# Patient Record
Sex: FEMALE | Race: WHITE | Employment: FULL TIME | URBAN - METROPOLITAN AREA
[De-identification: names, ages, dates, MRNs, and addresses within clinical notes are randomized per-mention and may not be internally consistent; named-entity substitution may affect disease eponyms.]

---

## 2018-10-05 DIAGNOSIS — M25.511 ACUTE PAIN OF RIGHT SHOULDER: Primary | ICD-10-CM

## 2018-10-08 ENCOUNTER — OFFICE VISIT (OUTPATIENT)
Dept: ORTHOPEDIC SURGERY | Age: 53
End: 2018-10-08
Payer: COMMERCIAL

## 2018-10-08 VITALS — TEMPERATURE: 98 F | BODY MASS INDEX: 25.44 KG/M2 | WEIGHT: 149 LBS | HEIGHT: 64 IN

## 2018-10-08 DIAGNOSIS — S46.219A BICEPS TENDON TEAR: Primary | ICD-10-CM

## 2018-10-08 DIAGNOSIS — M75.121 COMPLETE TEAR OF RIGHT ROTATOR CUFF: ICD-10-CM

## 2018-10-08 PROCEDURE — 99203 OFFICE O/P NEW LOW 30 MIN: CPT | Performed by: ORTHOPAEDIC SURGERY

## 2018-10-08 RX ORDER — LEVOTHYROXINE SODIUM 112 MCG
TABLET ORAL
COMMUNITY
Start: 2018-09-05 | End: 2019-05-14

## 2018-10-08 RX ORDER — TIZANIDINE 4 MG/1
TABLET ORAL
Refills: 0 | COMMUNITY
Start: 2018-07-09 | End: 2019-05-14

## 2018-10-08 NOTE — PROGRESS NOTES
SYSTEMS:     General/Constitution:  (-)weight loss, (-)fever, (-)chills, (-)weakness. Skin: (-) rash,(-) psoriasis,(-) eczema, (-)skin cancer. Musculoskeletal: (-) fractures,  (-) dislocations,(-) collagen vascular disease, (-) fibromyalgia, (-) multiple sclerosis, (-) muscular dystrophy, (-) RSD,(-) joint pain (-)swelling, (-) joint pain,swelling. Neurologic: (-) epilepsy, (-)seizures,(-) brain tumor,(-) TIA, (-)stroke, (-)headaches, (-)Parkinson disease,(-) memory loss, (-) LOC. Cardiovascular: (-) Chest pain, (-) swelling in legs/feet, (-) SOB, (-) cramping in legs/feet with walking. Respiratory: (-) SOB, (-) Coughing, (-) night sweats. GI: (-) nausea, (-) vomiting, (-) diarrhea, (-) blood in stool, (-) gastric ulcer. Psychiatric: (-) Depression, (-) Anxiety, (-) bipolar disease, (-) Alzheimer's Disease  Allergic/Immunologic: (-) allergies latex, (-) allergies metal, (-) skin sensitivity. Hematlogic: (-) anemia, (-) blood transfusion, (-) DVT/PE, (-) Clotting disorders      Subjective:    Constitution:  Ht. 5 ft 4 in. , Wt. 149 lbs. Psycihatric:  The patient is alert and oriented x 3, appears to be stated age and in no distress. Respiratory:  Respiratory effort is not labored. Patient is not gasping. Palpation of the chest reveals no tactile fremitus. Skin:  Upon inspection: the skin appears warm, dry and intact. There is not a previous scar over the affected area. There is not any cellulitis, lymphedema or cutaneous lesions noted in the lower extremities. Upon palpation there is no induration noted. Neurologic:  Motor exam of the upper extremities show: The reflexes in biceps/triceps/brachioradialis are equal and symmetric. Sensory exam C5-T1 are normal bilaterally. Cardiovascular: The vascular exam is normal and is well perfused to distal extremities. There are 2+ radial pulses bilaterally, and motor and sensation is intact to median, ulnar, and radial, musclocutaneus, and right rotator cuff        Plan: Natural history and expected course discussed. Questions answered. Educational material distributed. Reduction in offending activity. MRI. I will order an MRI of her right shoulder and see her back with the results.

## 2018-10-20 ENCOUNTER — HOSPITAL ENCOUNTER (OUTPATIENT)
Dept: GENERAL RADIOLOGY | Age: 53
Discharge: HOME | End: 2018-10-22
Payer: COMMERCIAL

## 2018-10-20 ENCOUNTER — HOSPITAL ENCOUNTER (OUTPATIENT)
Dept: MRI IMAGING | Age: 53
Discharge: HOME | End: 2018-10-22
Payer: COMMERCIAL

## 2018-10-20 DIAGNOSIS — T15.90XA FOREIGN BODY IN EYE, UNSPECIFIED LATERALITY, INITIAL ENCOUNTER: ICD-10-CM

## 2018-10-20 DIAGNOSIS — M75.121 COMPLETE TEAR OF RIGHT ROTATOR CUFF: ICD-10-CM

## 2018-10-20 DIAGNOSIS — S46.219A BICEPS TENDON TEAR: ICD-10-CM

## 2018-10-20 PROCEDURE — 70030 X-RAY EYE FOR FOREIGN BODY: CPT

## 2018-10-20 PROCEDURE — 73221 MRI JOINT UPR EXTREM W/O DYE: CPT

## 2018-11-06 ENCOUNTER — OFFICE VISIT (OUTPATIENT)
Dept: ORTHOPEDIC SURGERY | Age: 53
End: 2018-11-06
Payer: COMMERCIAL

## 2018-11-06 VITALS — WEIGHT: 150 LBS | HEIGHT: 64 IN | BODY MASS INDEX: 25.61 KG/M2

## 2018-11-06 DIAGNOSIS — M75.41 SHOULDER IMPINGEMENT, RIGHT: ICD-10-CM

## 2018-11-06 DIAGNOSIS — M75.111 INCOMPLETE TEAR OF RIGHT ROTATOR CUFF: Primary | ICD-10-CM

## 2018-11-06 DIAGNOSIS — S46.219A BICEPS TENDON TEAR: ICD-10-CM

## 2018-11-06 PROCEDURE — 99213 OFFICE O/P EST LOW 20 MIN: CPT | Performed by: ORTHOPAEDIC SURGERY

## 2018-11-06 RX ORDER — TRIAMCINOLONE ACETONIDE 40 MG/ML
40 INJECTION, SUSPENSION INTRA-ARTICULAR; INTRAMUSCULAR ONCE
Status: DISCONTINUED | OUTPATIENT
Start: 2018-11-06 | End: 2018-11-06

## 2018-11-06 RX ORDER — CELECOXIB 200 MG/1
200 CAPSULE ORAL DAILY
Qty: 30 CAPSULE | Refills: 3 | Status: SHIPPED | OUTPATIENT
Start: 2018-11-06 | End: 2019-05-14

## 2018-11-06 NOTE — PATIENT INSTRUCTIONS
arm (palm outward) behind your back by the wrist. Pull your arm up gently to stretch your shoulder. 3. Next, put a towel over your other shoulder. Put the hand of your injured arm behind your back. Now hold the back end of the towel. With the other hand, hold the front end of the towel in front of your body. Pull gently on the front end of the towel. This will bring your hand farther up your back to stretch your shoulder. Overhead stretch    1. Standing about an arm's length away, grasp onto a solid surface. You could use a countertop, a doorknob, or the back of a sturdy chair. 2. With your knees slightly bent, bend forward with your arms straight. Lower your upper body, and let your shoulders stretch. 3. As your shoulders are able to stretch farther, you may need to take a step or two backward. 4. Hold for at least 15 to 30 seconds. Then stand up and relax. If you had stepped back during your stretch, step forward so you can keep your hands on the solid surface. 5. Repeat 2 to 4 times. Shoulder flexion (lying down)    To make a wand for this exercise, use a piece of PVC pipe or a broom handle with the broom removed. Make the wand about a foot wider than your shoulders. 1. Lie on your back, holding a wand with both hands. Your palms should face down as you hold the wand. 2. Keeping your elbows straight, slowly raise your arms over your head. Raise them until you feel a stretch in your shoulders, upper back, and chest.  3. Hold for 15 to 30 seconds. 4. Repeat 2 to 4 times. Shoulder rotation (lying down)    To make a wand for this exercise, use a piece of PVC pipe or a broom handle with the broom removed. Make the wand about a foot wider than your shoulders. 1. Lie on your back. Hold a wand with both hands with your elbows bent and palms up. 2. Keep your elbows close to your body, and move the wand across your body toward the sore arm. 3. Hold for 8 to 12 seconds. 4. Repeat 2 to 4 times.   Wall climbing (to the side)    Avoid any movement that is straight to your side, and be careful not to arch your back. Your arm should stay about 30 degrees to the front of your side. 1. Stand with your side to a wall so that your fingers can just touch it at an angle about 30 degrees toward the front of your body. 2. Walk the fingers of your injured arm up the wall as high as pain permits. Try not to shrug your shoulder up toward your ear as you move your arm up. 3. Hold that position for a count of at least 15 to 20.  4. Walk your fingers back down to the starting position. 5. Repeat at least 2 to 4 times. Try to reach higher each time. Wall climbing (to the front)    During this stretching exercise, be careful not to arch your back. 1. Face a wall, and stand so your fingers can just touch it. 2. Keeping your shoulder down, walk the fingers of your injured arm up the wall as high as pain permits. (Don't shrug your shoulder up toward your ear.)  3. Hold your arm in that position for at least 15 to 30 seconds. 4. Slowly walk your fingers back down to where you started. 5. Repeat at least 2 to 4 times. Try to reach higher each time. Shoulder blade squeeze    1. Stand with your arms at your sides, and squeeze your shoulder blades together. Do not raise your shoulders up as you squeeze. 2. Hold 6 seconds. 3. Repeat 8 to 12 times. Scapular exercise: Arm reach    1. Lie flat on your back. This exercise is a very slight motion that starts with your arms raised (elbows straight, arms straight). 2. From this position, reach higher toward the mari or ceiling. Keep your elbows straight. All motion should be from your shoulder blade only. 3. Relax your arms back to where you started. 4. Repeat 8 to 12 times. Arm raise to the side    During this strengthening exercise, your arm should stay about 30 degrees to the front of your side. 1. Slowly raise your injured arm to the side, with your thumb facing up.  Raise your arm 60 comfort. This will help keep your arm at your side. 3. Hold one end of the elastic band with the hand of the painful arm. 4. Start with your forearm across your belly. Slowly rotate the forearm out away from your body. Keep your elbow and upper arm tucked against the towel roll or the side of your body until you begin to feel tightness in your shoulder. Slowly move your arm back to where you started. 5. Repeat 8 to 12 times. Follow-up care is a key part of your treatment and safety. Be sure to make and go to all appointments, and call your doctor if you are having problems. It's also a good idea to know your test results and keep a list of the medicines you take. Where can you learn more? Go to https://[employer]. org and sign in to your [employer] account. Enter Viridiana Silver in the [employer] box to learn more about \"Rotator Cuff: Exercises. \"     If you do not have an account, please click on the \"Sign Up Now\" link. Current as of: November 29, 2017  Content Version: 11.7  © 7678-2615 [employer]. Care instructions adapted under license by [facility] ([facility]). If you have questions about a medical condition or this instruction, always ask your healthcare professional. [address] any warranty or liability for your use of this information.

## 2019-03-21 ENCOUNTER — OFFICE VISIT (OUTPATIENT)
Dept: ORTHOPEDIC SURGERY | Age: 54
End: 2019-03-21
Payer: COMMERCIAL

## 2019-03-21 VITALS — BODY MASS INDEX: 24.75 KG/M2 | HEIGHT: 64 IN | TEMPERATURE: 98 F | WEIGHT: 145 LBS

## 2019-03-21 DIAGNOSIS — M75.111 INCOMPLETE TEAR OF RIGHT ROTATOR CUFF: Primary | ICD-10-CM

## 2019-03-21 DIAGNOSIS — S46.219A BICEPS TENDON TEAR: ICD-10-CM

## 2019-03-21 DIAGNOSIS — M75.41 SHOULDER IMPINGEMENT, RIGHT: ICD-10-CM

## 2019-03-21 PROCEDURE — 99213 OFFICE O/P EST LOW 20 MIN: CPT | Performed by: ORTHOPAEDIC SURGERY

## 2019-03-21 PROCEDURE — 20610 DRAIN/INJ JOINT/BURSA W/O US: CPT | Performed by: ORTHOPAEDIC SURGERY

## 2019-03-21 RX ORDER — TRIAMCINOLONE ACETONIDE 40 MG/ML
40 INJECTION, SUSPENSION INTRA-ARTICULAR; INTRAMUSCULAR ONCE
Status: COMPLETED | OUTPATIENT
Start: 2019-03-21 | End: 2019-03-21

## 2019-03-21 RX ADMIN — TRIAMCINOLONE ACETONIDE 40 MG: 40 INJECTION, SUSPENSION INTRA-ARTICULAR; INTRAMUSCULAR at 11:05

## 2019-05-14 ENCOUNTER — OFFICE VISIT (OUTPATIENT)
Dept: ORTHOPEDIC SURGERY | Age: 54
End: 2019-05-14
Payer: COMMERCIAL

## 2019-05-14 VITALS — WEIGHT: 145 LBS | BODY MASS INDEX: 24.75 KG/M2 | HEIGHT: 64 IN

## 2019-05-14 DIAGNOSIS — M75.41 SHOULDER IMPINGEMENT, RIGHT: ICD-10-CM

## 2019-05-14 DIAGNOSIS — M75.111 INCOMPLETE TEAR OF RIGHT ROTATOR CUFF: Primary | ICD-10-CM

## 2019-05-14 DIAGNOSIS — S46.219A BICEPS TENDON TEAR: ICD-10-CM

## 2019-05-14 PROCEDURE — 99214 OFFICE O/P EST MOD 30 MIN: CPT | Performed by: ORTHOPAEDIC SURGERY

## 2019-05-14 NOTE — PROGRESS NOTES
Days per week: Not on file     Minutes per session: Not on file    Stress: Not on file   Relationships    Social connections:     Talks on phone: Not on file     Gets together: Not on file     Attends Congregation service: Not on file     Active member of club or organization: Not on file     Attends meetings of clubs or organizations: Not on file     Relationship status: Not on file    Intimate partner violence:     Fear of current or ex partner: Not on file     Emotionally abused: Not on file     Physically abused: Not on file     Forced sexual activity: Not on file   Other Topics Concern    Not on file   Social History Narrative    Not on file     History reviewed. No pertinent family history. REVIEW OF SYSTEMS:     General/Constitution:  (-)weight loss, (-)fever, (-)chills, (-)weakness. Skin: (-) rash,(-) psoriasis,(-) eczema, (-)skin cancer. Musculoskeletal: (-) fractures,  (-) dislocations,(-) collagen vascular disease, (-) fibromyalgia, (-) multiple sclerosis, (-) muscular dystrophy, (-) RSD,(-) joint pain (-)swelling, (-) joint pain,swelling. Neurologic: (-) epilepsy, (-)seizures,(-) brain tumor,(-) TIA, (-)stroke, (-)headaches, (-)Parkinson disease,(-) memory loss, (-) LOC. Cardiovascular: (-) Chest pain, (-) swelling in legs/feet, (-) SOB, (-) cramping in legs/feet with walking. Respiratory: (-) SOB, (-) Coughing, (-) night sweats. GI: (-) nausea, (-) vomiting, (-) diarrhea, (-) blood in stool, (-) gastric ulcer. Psychiatric: (-) Depression, (-) Anxiety, (-) bipolar disease, (-) Alzheimer's Disease  Allergic/Immunologic: (-) allergies latex, (-) allergies metal, (-) skin sensitivity. Hematlogic: (-) anemia, (-) blood transfusion, (-) DVT/PE, (-) Clotting disorders      Subjective:    Constitution:  Ht 5' 4\" (1.626 m)   Wt 145 lb (65.8 kg)   BMI 24.89 kg/m²       Psycihatric:  The patient is alert and oriented x 3, appears to be stated age and in no distress.       Respiratory:  Respiratory effort is not labored. Patient is not gasping. Palpation of the chest reveals no tactile fremitus. Skin:  Upon inspection: the skin appears warm, dry and intact. There is not a previous scar over the affected area. There is not any cellulitis, lymphedema or cutaneous lesions noted in the lower extremities. Upon palpation there is no induration noted. Neurologic:  Motor exam of the upper extremities show: The reflexes in biceps/triceps/brachioradialis are equal and symmetric. Sensory exam C5-T1 are normal bilaterally. Cardiovascular: The vascular exam is normal and is well perfused to distal extremities. There are 2+ radial pulses bilaterally, and motor and sensation is intact to median, ulnar, and radial, musclocutaneus, and axillary nerve distribution and grossly symmetric bilaterally. There is cap refill noted less than two seconds in all digits. There is not edema of the bilateral upper extremities. There is not varicosities noted in the distal extremities. Lymph:  Upon palpation,  there is no lymphadenopathy noted in bilateral upper extremities. Musculoskeletal:  Gait: normal; examination of the nails and digits reveal no cyanosis or clubbing. Cervical Exam:  On physical exam, Noemi Dye is well-developed, well-nourished, oriented to person, place and time. her gait is normal.  On evaluation of hercervical spine, She has full range of motion of the cervical spine without pain. There is no cervical tenderness to palpation. Shoulder Exam:  On evaluation of her bilaterally upper extremities, her right shoulder has no deformity. There is tenderness upon palpation of the anterior and lateral shoulder, over the [facility] joint. There is not evidence of scapular dyskinesis. There is not muscle atrophy in shoulder girdle. The range of motion for the Right Shoulder is 150/40/T12 and for the Left shoulder is 160/45/T8.   Right shoulder Motor strength is 5-/5 in the supraspinatus, 5/5 internal rotation and 5-/5 in external rotation, and Left shoulder motor strength 5/5 in supraspinatus, 5/5 in internal rotation, 5/5 in external rotation. Right shoulder:  positive Impingement , positive Bermudez ,negative  Speeds,negative  Apprehension ,negative Craig Load Shift, negative Jessica manuver, negative Cross arm test.     Left shoulder:  negative Impingement , negative Bermudez ,negative  Speeds,negative  Apprehension ,negative Craig Load Shift, negative Jessica manuver, negative Cross arm test.     XRAY:    Normal findings, some AC joint arthritis    MRI:    Impression   1. Moderate supraspinatus and subscapularis tendinopathy and mild   infraspinatus tendinopathy. There is suggestion of bursal sided   fraying of the supraspinatus tendon. No full-thickness rotator cuff   tear is identified. 2. Partial tear of the intra-articular biceps tendon. 3. Mild acromioclavicular joint arthrosis with trace   subacromial/subdeltoid bursitis. Radiographic findings reviewed with patient    Impression:   Encounter Diagnoses   Name Primary?  Incomplete tear of right rotator cuff Yes    Shoulder impingement, right     Biceps tendon tear        Plan:   I had a lengthy discussion with the patient regarding their diagnosis. I explained treatment options including surgical vs non surgical treatment. I reviewed in detail the risks and benefits and outlined the procedure in detail with expected outcomes and possible complications. I also discussed non surgical treatment such as injections (CSI ), physical therapy, topical creams and NSAID's. She will find out if she has health insurance, if so we will proceed with right shoulder arthroscopy with sad and debridement.   Extend sick leave for 6 weeks, she will let us know about insurance and surgical treatment

## 2019-06-25 ENCOUNTER — OFFICE VISIT (OUTPATIENT)
Dept: ORTHOPEDIC SURGERY | Age: 54
End: 2019-06-25

## 2019-06-25 VITALS — HEIGHT: 64 IN | BODY MASS INDEX: 24.75 KG/M2 | WEIGHT: 145 LBS

## 2019-06-25 DIAGNOSIS — S46.219A BICEPS TENDON TEAR: ICD-10-CM

## 2019-06-25 DIAGNOSIS — M75.41 SHOULDER IMPINGEMENT, RIGHT: Primary | ICD-10-CM

## 2019-06-25 PROCEDURE — 99213 OFFICE O/P EST LOW 20 MIN: CPT | Performed by: ORTHOPAEDIC SURGERY

## 2019-06-25 PROCEDURE — 20610 DRAIN/INJ JOINT/BURSA W/O US: CPT | Performed by: ORTHOPAEDIC SURGERY

## 2019-06-25 RX ORDER — CELECOXIB 200 MG/1
CAPSULE ORAL
Refills: 0 | COMMUNITY
Start: 2019-06-14 | End: 2019-08-06 | Stop reason: SDUPTHER

## 2019-07-01 RX ORDER — TRIAMCINOLONE ACETONIDE 40 MG/ML
40 INJECTION, SUSPENSION INTRA-ARTICULAR; INTRAMUSCULAR ONCE
Status: SHIPPED | OUTPATIENT
Start: 2019-07-01

## 2019-08-06 ENCOUNTER — OFFICE VISIT (OUTPATIENT)
Dept: ORTHOPEDIC SURGERY | Age: 54
End: 2019-08-06
Payer: MEDICAID

## 2019-08-06 VITALS — BODY MASS INDEX: 24.77 KG/M2 | HEIGHT: 64 IN | WEIGHT: 145.06 LBS

## 2019-08-06 DIAGNOSIS — M75.41 SHOULDER IMPINGEMENT, RIGHT: Primary | ICD-10-CM

## 2019-08-06 DIAGNOSIS — M75.111 INCOMPLETE TEAR OF RIGHT ROTATOR CUFF, UNSPECIFIED WHETHER TRAUMATIC: ICD-10-CM

## 2019-08-06 PROCEDURE — G8427 DOCREV CUR MEDS BY ELIG CLIN: HCPCS | Performed by: ORTHOPAEDIC SURGERY

## 2019-08-06 PROCEDURE — 1036F TOBACCO NON-USER: CPT | Performed by: ORTHOPAEDIC SURGERY

## 2019-08-06 PROCEDURE — 99213 OFFICE O/P EST LOW 20 MIN: CPT | Performed by: ORTHOPAEDIC SURGERY

## 2019-08-06 PROCEDURE — G8420 CALC BMI NORM PARAMETERS: HCPCS | Performed by: ORTHOPAEDIC SURGERY

## 2019-08-06 PROCEDURE — 3017F COLORECTAL CA SCREEN DOC REV: CPT | Performed by: ORTHOPAEDIC SURGERY

## 2019-08-06 RX ORDER — CELECOXIB 200 MG/1
CAPSULE ORAL
Qty: 60 CAPSULE | Refills: 3 | Status: SHIPPED
Start: 2019-08-06 | End: 2022-08-06

## 2019-08-06 NOTE — PROGRESS NOTES
on file     Attends meetings of clubs or organizations: Not on file     Relationship status: Not on file    Intimate partner violence:     Fear of current or ex partner: Not on file     Emotionally abused: Not on file     Physically abused: Not on file     Forced sexual activity: Not on file   Other Topics Concern    Not on file   Social History Narrative    Not on file     History reviewed. No pertinent family history. REVIEW OF SYSTEMS:     General/Constitution:  (-)weight loss, (-)fever, (-)chills, (-)weakness. Skin: (-) rash,(-) psoriasis,(-) eczema, (-)skin cancer. Musculoskeletal: (-) fractures,  (-) dislocations,(-) collagen vascular disease, (-) fibromyalgia, (-) multiple sclerosis, (-) muscular dystrophy, (-) RSD,(-) joint pain (-)swelling, (-) joint pain,swelling. Neurologic: (-) epilepsy, (-)seizures,(-) brain tumor,(-) TIA, (-)stroke, (-)headaches, (-)Parkinson disease,(-) memory loss, (-) LOC. Cardiovascular: (-) Chest pain, (-) swelling in legs/feet, (-) SOB, (-) cramping in legs/feet with walking. Respiratory: (-) SOB, (-) Coughing, (-) night sweats. GI: (-) nausea, (-) vomiting, (-) diarrhea, (-) blood in stool, (-) gastric ulcer. Psychiatric: (-) Depression, (-) Anxiety, (-) bipolar disease, (-) Alzheimer's Disease  Allergic/Immunologic: (-) allergies latex, (-) allergies metal, (-) skin sensitivity. Hematlogic: (-) anemia, (-) blood transfusion, (-) DVT/PE, (-) Clotting disorders      Subjective:    Constitution:  Ht 5' 4.02\" (1.626 m)   Wt 145 lb 1 oz (65.8 kg)   BMI 24.89 kg/m²       Psycihatric:  The patient is alert and oriented x 3, appears to be stated age and in no distress. Respiratory:  Respiratory effort is not labored. Patient is not gasping. Palpation of the chest reveals no tactile fremitus. Skin:  Upon inspection: the skin appears warm, dry and intact. There is not a previous scar over the affected area. There is not any cellulitis, lymphedema or cutaneous lesions noted in the lower extremities. Upon palpation there is no induration noted. Neurologic:  Motor exam of the upper extremities show: The reflexes in biceps/triceps/brachioradialis are equal and symmetric. Sensory exam C5-T1 are normal bilaterally. Cardiovascular: The vascular exam is normal and is well perfused to distal extremities. There are 2+ radial pulses bilaterally, and motor and sensation is intact to median, ulnar, and radial, musclocutaneus, and axillary nerve distribution and grossly symmetric bilaterally. There is cap refill noted less than two seconds in all digits. There is not edema of the bilateral upper extremities. There is not varicosities noted in the distal extremities. Lymph:  Upon palpation,  there is no lymphadenopathy noted in bilateral upper extremities. Musculoskeletal:  Gait: normal; examination of the nails and digits reveal no cyanosis or clubbing. Cervical Exam:  On physical exam, Aram Lee is well-developed, well-nourished, oriented to person, place and time. her gait is normal.  On evaluation of hercervical spine, She has full range of motion of the cervical spine without pain. There is no cervical tenderness to palpation. Shoulder Exam:  On evaluation of her bilaterally upper extremities, her right shoulder has no deformity. There is tenderness upon palpation of the anterior and lateral shoulder, over the [facility] joint. There is not evidence of scapular dyskinesis. There is not muscle atrophy in shoulder girdle. The range of motion for the Right Shoulder is 150/40/T12 and for the Left shoulder is 160/45/T8. Right shoulder Motor strength is 5-/5 in the supraspinatus, 5/5 internal rotation and 5-/5 in external rotation, and Left shoulder motor strength 5/5 in supraspinatus, 5/5 in internal rotation, 5/5 in external rotation.         Right shoulder:  positive Impingement , positive Bermudez ,negative  Speeds,negative  Apprehension

## 2019-09-24 ENCOUNTER — TELEPHONE (OUTPATIENT)
Dept: ORTHOPEDIC SURGERY | Age: 54
End: 2019-09-24

## 2019-10-04 ENCOUNTER — TELEPHONE (OUTPATIENT)
Dept: ORTHOPEDIC SURGERY | Age: 54
End: 2019-10-04

## 2022-08-01 NOTE — H&P
[address]                              HISTORY AND PHYSICAL    PATIENT NAME: Audelia Ramos                    :        1965  MED REC NO:   [id number]                            ROOM:  ACCOUNT NO:   [de-identified]                           ADMIT DATE: 2022  PROVIDER:     Srinivas Manuel MD    Presenting for surgery 2022. .. HISTORY OF PRESENT ILLNESS:  The patient is a 35-year-old white female  who had presented with complaints of bloating and postmenopausal  bleeding. Ultrasound had demonstrated a 0.5 x 0.3 cm echogenic mass in  the endometrium, a 2 cm myometrial fibroid, and a one-half cm right  ovarian septated cyst with a 7-mm septation. She underwent an  endometrial biopsy which was negative. CA-125 was within normal limits  at 26. Continued expectant management ensued. The ovarian cyst  persisted as did the fibroid. She declined any further expectant  management and requested definitive surgical treatment. PAST MEDICAL HISTORY:  Significant for hypothyroidism. Hearing  deficiency. Hypercholesterolemia. PAST SURGICAL HISTORY:  Arm surgery. PAST GYNECOLOGIC HISTORY:  No abnormal Paps, DVT, PE, or STDs. 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 SOCIAL HISTORY:  No alcohol, tobacco or drugs. FAMILY HISTORY:  Noncontributory. ALLERGIES:  No known drug allergies. MEDICATIONS:  Simvastatin, levothyroxine. REVIEW OF SYSTEMS:  Negative for cardiac, respiratory, GI or   abnormalities. PHYSICAL EXAMINATION:  VITAL SIGNS:  Stable. Blood pressure 111/69, weight 159.5 pounds, and  height 5 feet, 3 inches. BMI 28. HEENT:  Negative. LUNGS:  Clear to auscultation. CV:  Regular rate and rhythm. ABDOMEN:  Obese, soft, nondistended, nontender. No masses. PELVIC:  External genitalia within normal limits. Vagina, no lesions.    Cervix, no lesions, prolapse to 1 cm above introitus. She has grade 2 uterine prolapse and enterocele. Uterus anteverted, 8 to 10 weeks' size, mobile and nontender. Adnexa nontender. No masses. RECTOVAGINAL:  Confirms she is guaiac negative. EXTREMITIES:  No clubbing, cyanosis, or edema. NEUROLOGIC:  CN II through XII are grossly intact. She is alert and  oriented x4. ASSESSMENT:  The patient with postmenopausal bleeding, bloating, fibroid  uterus, persistent 0.5 cm complex right adnexal mass. The patient  declining expectant management or conservative surgical management with  laparoscopic bilateral salpingo-oophorectomy and D and C hysteroscopy. Grade 2 uterine prolapse and enterocele. PLAN:  Laparoscopy, possible laparotomy, hysterectomy, bilateral  salpingo-oophorectomy, other procedures as indicated by operative  findings, robot assistance when available. She has been notified in the  event of excessive hemorrhage, she may require a blood transfusion. All  her questions have been answered and she wishes to proceed with surgery.       Aide Guerra MD    D: 07/31/2022 14:52:33       T: 07/31/2022 14:55:03     [email or web address]  Job#: [id number]     Doc#: [id number]

## 2022-08-04 ENCOUNTER — ANESTHESIA EVENT (OUTPATIENT)
Dept: OPERATING ROOM | Age: 57
End: 2022-08-04
Payer: COMMERCIAL

## 2022-08-04 RX ORDER — LORAZEPAM 0.5 MG/1
0.5 TABLET ORAL ONCE
Status: ON HOLD | COMMUNITY
End: 2022-08-06 | Stop reason: HOSPADM

## 2022-08-04 RX ORDER — LIOTHYRONINE SODIUM 5 UG/1
5 TABLET ORAL 2 TIMES DAILY
COMMUNITY

## 2022-08-04 RX ORDER — LEVOTHYROXINE SODIUM 88 UG/1
88 TABLET ORAL DAILY
COMMUNITY

## 2022-08-04 NOTE — PROGRESS NOTES
[address] [facility]                                                                                                                    PRE OP INSTRUCTIONS FOR  Erasmo Lobo        Date: 8/4/2022    Date of surgery: 8/5/2022   Arrival Time: Hospital will call you between 5pm and 7pm with your final arrival time for surgery    Nothing by mouth (NPO) as instructed. ___midnight _________________________________________________________________    Take the following medications with a small sip of water on the morning of Surgery:  take lorazepam    Diabetics may take evening dose of insulin but none after midnight. If you feel symptomatic or low blood sugar morning of surgery drink 1-2 ounces of apple juice only. Aspirin, Ibuprofen, Advil, Naproxen, Vitamin E and other Anti-inflammatory products should be stopped  before surgery  as directed by your physician. Take Tylenol only unless instructed otherwise by your surgeon. Check with your Doctor regarding stopping Plavix, Coumadin, Lovenox, Eliquis, Effient, or other blood thinners. Do not smoke,use illicit drugs and do not drink any alcoholic beverages 24 hours prior to surgery. You may brush your teeth the morning of surgery. DO NOT SWALLOW WATER    You MUST make arrangements for a responsible adult to take you home after your surgery. You will not be allowed to leave alone or drive yourself home. It is strongly suggested someone stay with you the first 24 hrs. Your surgery will be cancelled if you do not have a ride home. PEDIATRIC PATIENTS ONLY:  A parent/legal guardian must accompany a child scheduled for surgery and plan to stay at the hospital until the child is discharged. Please do not bring other children with you.     Please wear simple, loose fitting clothing to the hospital.  Carmen Lo not bring valuables (money, credit cards, checkbooks, etc.) Do not wear any makeup (including no eye makeup) or nail polish on your fingers or toes.    DO NOT wear any jewelry or piercings on day of surgery. All body piercing jewelry must be removed. Shower the night before surgery with _x__Antibacterial soap /LUCIEN WIPES________    TOTAL JOINT REPLACEMENT/HYSTERECTOMY PATIENTS ONLY---Remember to bring Blood Bank bracelet to the hospital on the day of surgery. If you have a Living Will and Durable Power of  for Healthcare, please bring in a copy. If appropriate bring crutches, inspirex, WALKER, CANE etc... Notify your Surgeon if you develop any illness between now and surgery time, cough, cold, fever, sore throat, nausea, vomiting, etc.  Please notify your surgeon if you experience dizziness, shortness of breath or blurred vision between now & the time of your surgery. If you have ___dentures, they will be removed before going to the OR; we will provide you a container. If you wear ___contact lenses or ___glasses, they will be removed; please bring a case for them. To provide excellent care visitors will be limited to 2 in the room at any given time. Please bring picture ID and insurance card. During flu season no children under the age of 15 are permitted in the hospital for the safety of all patients. Other                  Please call AMBULATORY CARE if you have any further questions.    [address]     [address]

## 2022-08-05 ENCOUNTER — ANESTHESIA (OUTPATIENT)
Dept: OPERATING ROOM | Age: 57
End: 2022-08-05
Payer: COMMERCIAL

## 2022-08-05 ENCOUNTER — HOSPITAL ENCOUNTER (OUTPATIENT)
Age: 57
Setting detail: OBSERVATION
Discharge: HOME | End: 2022-08-06
Attending: LEGAL MEDICINE | Admitting: LEGAL MEDICINE
Payer: COMMERCIAL

## 2022-08-05 DIAGNOSIS — N83.201 CYST OF RIGHT OVARY: ICD-10-CM

## 2022-08-05 PROBLEM — R19.00 PELVIC MASS IN FEMALE: Status: ACTIVE | Noted: 2022-08-05

## 2022-08-05 LAB
ABO/RH: NORMAL
ANTIBODY SCREEN: NORMAL
HCT VFR BLD CALC: 37.7 % (ref 34–48)
HCT VFR BLD CALC: 44.3 % (ref 34–48)
HEMOGLOBIN: 12.3 G/DL (ref 11.5–15.5)
HEMOGLOBIN: 15 G/DL (ref 11.5–15.5)
MCH RBC QN AUTO: 28.6 PG (ref 26–35)
MCH RBC QN AUTO: 29.1 PG (ref 26–35)
MCHC RBC AUTO-ENTMCNC: 32.6 % (ref 32–34.5)
MCHC RBC AUTO-ENTMCNC: 33.9 % (ref 32–34.5)
MCV RBC AUTO: 86 FL (ref 80–99.9)
MCV RBC AUTO: 87.7 FL (ref 80–99.9)
PDW BLD-RTO: 11.8 FL (ref 11.5–15)
PDW BLD-RTO: 11.9 FL (ref 11.5–15)
PLATELET # BLD: 212 E9/L (ref 130–450)
PLATELET # BLD: 242 E9/L (ref 130–450)
PMV BLD AUTO: 10.5 FL (ref 7–12)
PMV BLD AUTO: 10.9 FL (ref 7–12)
RBC # BLD: 4.3 E12/L (ref 3.5–5.5)
RBC # BLD: 5.15 E12/L (ref 3.5–5.5)
WBC # BLD: 5.2 E9/L (ref 4.5–11.5)
WBC # BLD: 9.9 E9/L (ref 4.5–11.5)

## 2022-08-05 PROCEDURE — 2580000003 HC RX 258: Performed by: LEGAL MEDICINE

## 2022-08-05 PROCEDURE — 6360000002 HC RX W HCPCS: Performed by: LEGAL MEDICINE

## 2022-08-05 PROCEDURE — 3600000009 HC SURGERY ROBOT BASE: Performed by: LEGAL MEDICINE

## 2022-08-05 PROCEDURE — 85027 COMPLETE CBC AUTOMATED: CPT

## 2022-08-05 PROCEDURE — 2709999900 HC NON-CHARGEABLE SUPPLY: Performed by: LEGAL MEDICINE

## 2022-08-05 PROCEDURE — 7100000001 HC PACU RECOVERY - ADDTL 15 MIN: Performed by: LEGAL MEDICINE

## 2022-08-05 PROCEDURE — 2500000003 HC RX 250 WO HCPCS

## 2022-08-05 PROCEDURE — 3600000019 HC SURGERY ROBOT ADDTL 15MIN: Performed by: LEGAL MEDICINE

## 2022-08-05 PROCEDURE — 86900 BLOOD TYPING SEROLOGIC ABO: CPT

## 2022-08-05 PROCEDURE — 3700000000 HC ANESTHESIA ATTENDED CARE: Performed by: LEGAL MEDICINE

## 2022-08-05 PROCEDURE — 2500000003 HC RX 250 WO HCPCS: Performed by: LEGAL MEDICINE

## 2022-08-05 PROCEDURE — 7100000000 HC PACU RECOVERY - FIRST 15 MIN: Performed by: LEGAL MEDICINE

## 2022-08-05 PROCEDURE — 36415 COLL VENOUS BLD VENIPUNCTURE: CPT

## 2022-08-05 PROCEDURE — 2720000010 HC SURG SUPPLY STERILE: Performed by: LEGAL MEDICINE

## 2022-08-05 PROCEDURE — 3700000001 HC ADD 15 MINUTES (ANESTHESIA): Performed by: LEGAL MEDICINE

## 2022-08-05 PROCEDURE — 6360000002 HC RX W HCPCS: Performed by: ANESTHESIOLOGY

## 2022-08-05 PROCEDURE — G0378 HOSPITAL OBSERVATION PER HR: HCPCS

## 2022-08-05 PROCEDURE — S2900 ROBOTIC SURGICAL SYSTEM: HCPCS | Performed by: LEGAL MEDICINE

## 2022-08-05 PROCEDURE — 88307 TISSUE EXAM BY PATHOLOGIST: CPT

## 2022-08-05 PROCEDURE — 6360000002 HC RX W HCPCS

## 2022-08-05 PROCEDURE — 86850 RBC ANTIBODY SCREEN: CPT

## 2022-08-05 PROCEDURE — 86901 BLOOD TYPING SEROLOGIC RH(D): CPT

## 2022-08-05 PROCEDURE — 88305 TISSUE EXAM BY PATHOLOGIST: CPT

## 2022-08-05 PROCEDURE — 2700000000 HC OXYGEN THERAPY PER DAY

## 2022-08-05 RX ORDER — SODIUM CHLORIDE 9 MG/ML
INJECTION, SOLUTION INTRAVENOUS PRN
Status: DISCONTINUED | OUTPATIENT
Start: 2022-08-05 | End: 2022-08-06 | Stop reason: HOSPADM

## 2022-08-05 RX ORDER — ONDANSETRON 2 MG/ML
INJECTION INTRAMUSCULAR; INTRAVENOUS PRN
Status: DISCONTINUED | OUTPATIENT
Start: 2022-08-05 | End: 2022-08-05 | Stop reason: SDUPTHER

## 2022-08-05 RX ORDER — SODIUM CHLORIDE 0.9 % (FLUSH) 0.9 %
5-40 SYRINGE (ML) INJECTION PRN
Status: DISCONTINUED | OUTPATIENT
Start: 2022-08-05 | End: 2022-08-05

## 2022-08-05 RX ORDER — ONDANSETRON 2 MG/ML
4 INJECTION INTRAMUSCULAR; INTRAVENOUS
Status: DISCONTINUED | OUTPATIENT
Start: 2022-08-05 | End: 2022-08-05 | Stop reason: HOSPADM

## 2022-08-05 RX ORDER — SODIUM CHLORIDE, SODIUM LACTATE, POTASSIUM CHLORIDE, CALCIUM CHLORIDE 600; 310; 30; 20 MG/100ML; MG/100ML; MG/100ML; MG/100ML
125 INJECTION, SOLUTION INTRAVENOUS CONTINUOUS
Status: DISCONTINUED | OUTPATIENT
Start: 2022-08-05 | End: 2022-08-05 | Stop reason: CLARIF

## 2022-08-05 RX ORDER — DIPHENHYDRAMINE HYDROCHLORIDE 50 MG/ML
50 INJECTION INTRAMUSCULAR; INTRAVENOUS EVERY 6 HOURS PRN
Status: DISCONTINUED | OUTPATIENT
Start: 2022-08-05 | End: 2022-08-06 | Stop reason: HOSPADM

## 2022-08-05 RX ORDER — SODIUM CHLORIDE, SODIUM LACTATE, POTASSIUM CHLORIDE, CALCIUM CHLORIDE 600; 310; 30; 20 MG/100ML; MG/100ML; MG/100ML; MG/100ML
INJECTION, SOLUTION INTRAVENOUS CONTINUOUS
Status: DISCONTINUED | OUTPATIENT
Start: 2022-08-05 | End: 2022-08-05 | Stop reason: HOSPADM

## 2022-08-05 RX ORDER — METRONIDAZOLE 500 MG/100ML
500 INJECTION, SOLUTION INTRAVENOUS
Status: COMPLETED | OUTPATIENT
Start: 2022-08-05 | End: 2022-08-05

## 2022-08-05 RX ORDER — METOCLOPRAMIDE HYDROCHLORIDE 5 MG/ML
10 INJECTION INTRAMUSCULAR; INTRAVENOUS EVERY 6 HOURS PRN
Status: DISCONTINUED | OUTPATIENT
Start: 2022-08-05 | End: 2022-08-06 | Stop reason: HOSPADM

## 2022-08-05 RX ORDER — GLYCOPYRROLATE 0.2 MG/ML
INJECTION INTRAMUSCULAR; INTRAVENOUS PRN
Status: DISCONTINUED | OUTPATIENT
Start: 2022-08-05 | End: 2022-08-05 | Stop reason: SDUPTHER

## 2022-08-05 RX ORDER — HYDROMORPHONE HYDROCHLORIDE 1 MG/ML
0.25 INJECTION, SOLUTION INTRAMUSCULAR; INTRAVENOUS; SUBCUTANEOUS
Status: DISCONTINUED | OUTPATIENT
Start: 2022-08-05 | End: 2022-08-06

## 2022-08-05 RX ORDER — METRONIDAZOLE 500 MG/100ML
500 INJECTION, SOLUTION INTRAVENOUS EVERY 8 HOURS
Status: COMPLETED | OUTPATIENT
Start: 2022-08-05 | End: 2022-08-06

## 2022-08-05 RX ORDER — ROCURONIUM BROMIDE 10 MG/ML
INJECTION, SOLUTION INTRAVENOUS PRN
Status: DISCONTINUED | OUTPATIENT
Start: 2022-08-05 | End: 2022-08-05 | Stop reason: SDUPTHER

## 2022-08-05 RX ORDER — HYDROMORPHONE HYDROCHLORIDE 1 MG/ML
0.5 INJECTION, SOLUTION INTRAMUSCULAR; INTRAVENOUS; SUBCUTANEOUS
Status: DISCONTINUED | OUTPATIENT
Start: 2022-08-05 | End: 2022-08-06

## 2022-08-05 RX ORDER — CIPROFLOXACIN 2 MG/ML
400 INJECTION, SOLUTION INTRAVENOUS
Status: COMPLETED | OUTPATIENT
Start: 2022-08-05 | End: 2022-08-05

## 2022-08-05 RX ORDER — NEOSTIGMINE METHYLSULFATE 1 MG/ML
INJECTION, SOLUTION INTRAVENOUS PRN
Status: DISCONTINUED | OUTPATIENT
Start: 2022-08-05 | End: 2022-08-05 | Stop reason: SDUPTHER

## 2022-08-05 RX ORDER — DEXAMETHASONE SODIUM PHOSPHATE 10 MG/ML
INJECTION, SOLUTION INTRAMUSCULAR; INTRAVENOUS PRN
Status: DISCONTINUED | OUTPATIENT
Start: 2022-08-05 | End: 2022-08-05 | Stop reason: SDUPTHER

## 2022-08-05 RX ORDER — LIDOCAINE HYDROCHLORIDE 20 MG/ML
INJECTION, SOLUTION INTRAVENOUS PRN
Status: DISCONTINUED | OUTPATIENT
Start: 2022-08-05 | End: 2022-08-05 | Stop reason: SDUPTHER

## 2022-08-05 RX ORDER — ONDANSETRON 2 MG/ML
4 INJECTION INTRAMUSCULAR; INTRAVENOUS EVERY 6 HOURS PRN
Status: DISCONTINUED | OUTPATIENT
Start: 2022-08-05 | End: 2022-08-06 | Stop reason: HOSPADM

## 2022-08-05 RX ORDER — CIPROFLOXACIN 2 MG/ML
400 INJECTION, SOLUTION INTRAVENOUS ONCE
Status: COMPLETED | OUTPATIENT
Start: 2022-08-05 | End: 2022-08-05

## 2022-08-05 RX ORDER — PROPOFOL 10 MG/ML
INJECTION, EMULSION INTRAVENOUS PRN
Status: DISCONTINUED | OUTPATIENT
Start: 2022-08-05 | End: 2022-08-05 | Stop reason: SDUPTHER

## 2022-08-05 RX ORDER — SODIUM CHLORIDE 0.9 % (FLUSH) 0.9 %
5-40 SYRINGE (ML) INJECTION EVERY 12 HOURS SCHEDULED
Status: DISCONTINUED | OUTPATIENT
Start: 2022-08-05 | End: 2022-08-06 | Stop reason: HOSPADM

## 2022-08-05 RX ORDER — HYDRALAZINE HYDROCHLORIDE 20 MG/ML
10 INJECTION INTRAMUSCULAR; INTRAVENOUS
Status: DISCONTINUED | OUTPATIENT
Start: 2022-08-05 | End: 2022-08-05 | Stop reason: HOSPADM

## 2022-08-05 RX ORDER — SODIUM CHLORIDE 0.9 % (FLUSH) 0.9 %
5-40 SYRINGE (ML) INJECTION EVERY 12 HOURS SCHEDULED
Status: DISCONTINUED | OUTPATIENT
Start: 2022-08-05 | End: 2022-08-05

## 2022-08-05 RX ORDER — LORAZEPAM 2 MG/ML
0.5 INJECTION INTRAMUSCULAR EVERY 6 HOURS PRN
Status: DISCONTINUED | OUTPATIENT
Start: 2022-08-05 | End: 2022-08-06 | Stop reason: HOSPADM

## 2022-08-05 RX ORDER — SODIUM CHLORIDE, SODIUM LACTATE, POTASSIUM CHLORIDE, CALCIUM CHLORIDE 600; 310; 30; 20 MG/100ML; MG/100ML; MG/100ML; MG/100ML
INJECTION, SOLUTION INTRAVENOUS CONTINUOUS
Status: DISCONTINUED | OUTPATIENT
Start: 2022-08-05 | End: 2022-08-06

## 2022-08-05 RX ORDER — MIDAZOLAM HYDROCHLORIDE 1 MG/ML
INJECTION INTRAMUSCULAR; INTRAVENOUS PRN
Status: DISCONTINUED | OUTPATIENT
Start: 2022-08-05 | End: 2022-08-05 | Stop reason: SDUPTHER

## 2022-08-05 RX ORDER — VASOPRESSIN 20 U/ML
INJECTION PARENTERAL PRN
Status: DISCONTINUED | OUTPATIENT
Start: 2022-08-05 | End: 2022-08-05 | Stop reason: ALTCHOICE

## 2022-08-05 RX ORDER — SODIUM CHLORIDE 0.9 % (FLUSH) 0.9 %
5-40 SYRINGE (ML) INJECTION PRN
Status: DISCONTINUED | OUTPATIENT
Start: 2022-08-05 | End: 2022-08-05 | Stop reason: HOSPADM

## 2022-08-05 RX ORDER — SODIUM CHLORIDE 9 MG/ML
INJECTION, SOLUTION INTRAVENOUS PRN
Status: DISCONTINUED | OUTPATIENT
Start: 2022-08-05 | End: 2022-08-05

## 2022-08-05 RX ORDER — SODIUM CHLORIDE 9 MG/ML
25 INJECTION, SOLUTION INTRAVENOUS PRN
Status: DISCONTINUED | OUTPATIENT
Start: 2022-08-05 | End: 2022-08-05 | Stop reason: HOSPADM

## 2022-08-05 RX ORDER — MIDAZOLAM HYDROCHLORIDE 1 MG/ML
2 INJECTION INTRAMUSCULAR; INTRAVENOUS
Status: DISCONTINUED | OUTPATIENT
Start: 2022-08-05 | End: 2022-08-05 | Stop reason: HOSPADM

## 2022-08-05 RX ORDER — FENTANYL CITRATE 50 UG/ML
INJECTION, SOLUTION INTRAMUSCULAR; INTRAVENOUS PRN
Status: DISCONTINUED | OUTPATIENT
Start: 2022-08-05 | End: 2022-08-05 | Stop reason: SDUPTHER

## 2022-08-05 RX ORDER — SODIUM CHLORIDE 0.9 % (FLUSH) 0.9 %
5-40 SYRINGE (ML) INJECTION PRN
Status: DISCONTINUED | OUTPATIENT
Start: 2022-08-05 | End: 2022-08-06 | Stop reason: HOSPADM

## 2022-08-05 RX ORDER — IPRATROPIUM BROMIDE AND ALBUTEROL SULFATE 2.5; .5 MG/3ML; MG/3ML
1 SOLUTION RESPIRATORY (INHALATION)
Status: DISCONTINUED | OUTPATIENT
Start: 2022-08-05 | End: 2022-08-05 | Stop reason: HOSPADM

## 2022-08-05 RX ORDER — SODIUM CHLORIDE 0.9 % (FLUSH) 0.9 %
5-40 SYRINGE (ML) INJECTION EVERY 12 HOURS SCHEDULED
Status: DISCONTINUED | OUTPATIENT
Start: 2022-08-05 | End: 2022-08-05 | Stop reason: HOSPADM

## 2022-08-05 RX ORDER — LABETALOL HYDROCHLORIDE 5 MG/ML
10 INJECTION, SOLUTION INTRAVENOUS
Status: DISCONTINUED | OUTPATIENT
Start: 2022-08-05 | End: 2022-08-05 | Stop reason: HOSPADM

## 2022-08-05 RX ORDER — MEPERIDINE HYDROCHLORIDE 25 MG/ML
12.5 INJECTION INTRAMUSCULAR; INTRAVENOUS; SUBCUTANEOUS EVERY 5 MIN PRN
Status: DISCONTINUED | OUTPATIENT
Start: 2022-08-05 | End: 2022-08-05 | Stop reason: HOSPADM

## 2022-08-05 RX ADMIN — GLYCOPYRROLATE 0.2 MG: 0.2 INJECTION INTRAMUSCULAR; INTRAVENOUS at 08:32

## 2022-08-05 RX ADMIN — METRONIDAZOLE 500 MG: 500 SOLUTION INTRAVENOUS at 08:11

## 2022-08-05 RX ADMIN — HYDROMORPHONE HYDROCHLORIDE 0.5 MG: 1 INJECTION, SOLUTION INTRAMUSCULAR; INTRAVENOUS; SUBCUTANEOUS at 20:40

## 2022-08-05 RX ADMIN — MIDAZOLAM 2 MG: 1 INJECTION INTRAMUSCULAR; INTRAVENOUS at 07:59

## 2022-08-05 RX ADMIN — METRONIDAZOLE 500 MG: 500 INJECTION, SOLUTION INTRAVENOUS at 18:12

## 2022-08-05 RX ADMIN — CIPROFLOXACIN 400 MG: 2 INJECTION, SOLUTION INTRAVENOUS at 20:45

## 2022-08-05 RX ADMIN — HYDROMORPHONE HYDROCHLORIDE 0.5 MG: 1 INJECTION, SOLUTION INTRAMUSCULAR; INTRAVENOUS; SUBCUTANEOUS at 12:21

## 2022-08-05 RX ADMIN — LIDOCAINE HYDROCHLORIDE 100 MG: 20 INJECTION INTRAVENOUS at 08:09

## 2022-08-05 RX ADMIN — FENTANYL CITRATE 100 MCG: 50 INJECTION, SOLUTION INTRAMUSCULAR; INTRAVENOUS at 08:09

## 2022-08-05 RX ADMIN — SODIUM CHLORIDE, POTASSIUM CHLORIDE, SODIUM LACTATE AND CALCIUM CHLORIDE 125 ML: 600; 310; 30; 20 INJECTION, SOLUTION INTRAVENOUS at 12:20

## 2022-08-05 RX ADMIN — FENTANYL CITRATE 25 MCG: 50 INJECTION, SOLUTION INTRAMUSCULAR; INTRAVENOUS at 09:47

## 2022-08-05 RX ADMIN — SODIUM CHLORIDE, POTASSIUM CHLORIDE, SODIUM LACTATE AND CALCIUM CHLORIDE: 600; 310; 30; 20 INJECTION, SOLUTION INTRAVENOUS at 09:20

## 2022-08-05 RX ADMIN — PROPOFOL 180 MG: 10 INJECTION, EMULSION INTRAVENOUS at 08:09

## 2022-08-05 RX ADMIN — ONDANSETRON 4 MG: 2 INJECTION INTRAMUSCULAR; INTRAVENOUS at 10:09

## 2022-08-05 RX ADMIN — Medication 2 MG: at 10:25

## 2022-08-05 RX ADMIN — DEXAMETHASONE SODIUM PHOSPHATE 10 MG: 10 INJECTION, SOLUTION INTRAMUSCULAR; INTRAVENOUS at 08:16

## 2022-08-05 RX ADMIN — ROCURONIUM BROMIDE 10 MG: 10 INJECTION, SOLUTION INTRAVENOUS at 09:19

## 2022-08-05 RX ADMIN — SODIUM CHLORIDE, POTASSIUM CHLORIDE, SODIUM LACTATE AND CALCIUM CHLORIDE: 600; 310; 30; 20 INJECTION, SOLUTION INTRAVENOUS at 06:44

## 2022-08-05 RX ADMIN — HYDROMORPHONE HYDROCHLORIDE 0.25 MG: 1 INJECTION, SOLUTION INTRAMUSCULAR; INTRAVENOUS; SUBCUTANEOUS at 10:58

## 2022-08-05 RX ADMIN — SODIUM CHLORIDE, POTASSIUM CHLORIDE, SODIUM LACTATE AND CALCIUM CHLORIDE: 600; 310; 30; 20 INJECTION, SOLUTION INTRAVENOUS at 21:30

## 2022-08-05 RX ADMIN — FENTANYL CITRATE 50 MCG: 50 INJECTION, SOLUTION INTRAMUSCULAR; INTRAVENOUS at 08:46

## 2022-08-05 RX ADMIN — SODIUM CHLORIDE, POTASSIUM CHLORIDE, SODIUM LACTATE AND CALCIUM CHLORIDE 125 ML: 600; 310; 30; 20 INJECTION, SOLUTION INTRAVENOUS at 20:44

## 2022-08-05 RX ADMIN — SODIUM CHLORIDE, PRESERVATIVE FREE 10 ML: 5 INJECTION INTRAVENOUS at 06:10

## 2022-08-05 RX ADMIN — HYDROMORPHONE HYDROCHLORIDE 0.5 MG: 1 INJECTION, SOLUTION INTRAMUSCULAR; INTRAVENOUS; SUBCUTANEOUS at 15:54

## 2022-08-05 RX ADMIN — GLYCOPYRROLATE 0.4 MG: 0.2 INJECTION INTRAMUSCULAR; INTRAVENOUS at 10:25

## 2022-08-05 RX ADMIN — CIPROFLOXACIN 400 MG: 2 INJECTION, SOLUTION INTRAVENOUS at 08:21

## 2022-08-05 RX ADMIN — HYDROMORPHONE HYDROCHLORIDE 0.25 MG: 1 INJECTION, SOLUTION INTRAMUSCULAR; INTRAVENOUS; SUBCUTANEOUS at 11:25

## 2022-08-05 RX ADMIN — ROCURONIUM BROMIDE 50 MG: 10 INJECTION, SOLUTION INTRAVENOUS at 08:09

## 2022-08-05 ASSESSMENT — PAIN DESCRIPTION - LOCATION
LOCATION: ABDOMEN
LOCATION: ABDOMEN

## 2022-08-05 ASSESSMENT — PAIN SCALES - GENERAL
PAINLEVEL_OUTOF10: 8
PAINLEVEL_OUTOF10: 9
PAINLEVEL_OUTOF10: 7
PAINLEVEL_OUTOF10: 9
PAINLEVEL_OUTOF10: 6

## 2022-08-05 ASSESSMENT — LIFESTYLE VARIABLES
HOW OFTEN DO YOU HAVE A DRINK CONTAINING ALCOHOL: NEVER
SMOKING_STATUS: 0

## 2022-08-05 ASSESSMENT — PAIN DESCRIPTION - DESCRIPTORS
DESCRIPTORS: SORE
DESCRIPTORS: SORE

## 2022-08-05 ASSESSMENT — PAIN - FUNCTIONAL ASSESSMENT: PAIN_FUNCTIONAL_ASSESSMENT: NONE - DENIES PAIN

## 2022-08-05 ASSESSMENT — PAIN DESCRIPTION - PAIN TYPE: TYPE: SURGICAL PAIN

## 2022-08-05 NOTE — DISCHARGE INSTRUCTIONS
Your information:  Name: Elio Lockhart  : 1965    Your instructions:    Discharge home. Follow up with primary care provider as directed. Follow up with Dr. Kath Arenas on 2022 at 5:30pm.  Stay on liquids and crackers until passing gas. Drink enough fluids to keep the urine a pale yellow color. Start the estrogen hormone prescription once you go home. Nothing per vagina for six weeks. Signs and symptoms to look out for:  Call 911 anytime you think you may need emergency care. For example, call if:    You passed out (lost consciousness). You have chest pain, are short of breath, or cough up blood. Call your doctor now or seek immediate medical care if:    You have pain that does not get better after you take pain medicine. You cannot pass stools or gas. You have vaginal discharge that has increased in amount or smells bad. You are sick to your stomach or cannot drink fluids. You have loose stitches, or your incision comes open. Bright red blood has soaked through the bandage over your incision. You have signs of infection, such as: Increased pain, swelling, warmth, or redness. Red streaks leading from the incision. Pus draining from the incision. A fever. You have severe vaginal bleeding. This means that you are soaking through your usual pads every hour for 2 or more hours. You have signs of a blood clot in your leg (called a deep vein thrombosis), such as:  Pain in your calf, back of the knee, thigh, or groin. Redness and swelling in your leg. Watch closely for changes in your health, and be sure to contact your doctor if you have any problems.     What to do after you leave the hospital:    Recommended diet: clear liquids, advance as tolerated    Recommended activity: activity as tolerated    The following personal items were collected during your admission and were returned to you:    Belongings  Dental Appliances: None  Vision - Corrective Lenses: None  Hearing Aid: None  Clothing: At home  Jewelry: None  Body Piercings Removed: N/A  Electronic Devices: Cell Phone  Weapons (Notify Protective Services/Security): None  Other Valuables: At home  Home Medications: None  Valuables Given To: Patient  Provide Name(s) of Who Valuable(s) Were Given To: mary carmen  Responsible person(s) in the waiting room: na  Patient approves for provider to speak to responsible person post operatively: No    Information obtained by:  By signing below, I understand that if any problems occur once I leave the hospital I am to contact Dr. Sariah Mitchell. I understand and acknowledge receipt of the instructions indicated above.

## 2022-08-05 NOTE — H&P
H+P no change. Updated. Blood pressure (!) 140/76, pulse 80, temperature 98.2 °F (36.8 °C), temperature source Infrared, resp. rate 16, height 5' 4\" (1.626 m), weight 153 lb (69.4 kg), SpO2 98 %.

## 2022-08-05 NOTE — ANESTHESIA PRE PROCEDURE
Yeimy Retana MD        metronidazole (FLAGYL) 500 mg in 0.9% NaCl 100 mL IVPB premix  500 mg IntraVENous On Call to [facility] MD Gallo           Allergies:  No Known Allergies    Problem List:    Patient Active Problem List   Diagnosis Code    Complete tear of right rotator cuff M75.121    Biceps tendon tear S46.219A       Past Medical History:        Diagnosis Date    Thyroid disease        Past Surgical History:        Procedure Laterality Date    CYST REMOVAL      axilla    FOOT SURGERY      spur    TONSILLECTOMY         Social History:    Social History     Tobacco Use    Smoking status: Former     Types: Cigarettes     Quit date: 2012     Years since quitting: 10.6    Smokeless tobacco: Never   Substance Use Topics    Alcohol use: Not Currently                                Counseling given: Not Answered      Vital Signs (Current):   Vitals:    08/04/22 0936 08/05/22 0549   BP:  (!) 140/76   Pulse:  80   Resp:  16   Temp:  98.2 °F (36.8 °C)   TempSrc:  Infrared   SpO2:  98%   Weight: 155 lb (70.3 kg) 153 lb (69.4 kg)   Height: 5' 4\" (1.626 m) 5' 4\" (1.626 m)                                              BP Readings from Last 3 Encounters:   08/05/22 (!) 140/76       NPO Status: Time of last liquid consumption: 0001                        Time of last solid consumption: 1800                        Date of last liquid consumption: 08/05/22                        Date of last solid food consumption: 08/02/22    BMI:   Wt Readings from Last 3 Encounters:   08/05/22 153 lb (69.4 kg)   08/06/19 145 lb 1 oz (65.8 kg)   06/25/19 145 lb (65.8 kg)     Body mass index is 26.26 kg/m².     CBC:   Lab Results   Component Value Date/Time    WBC 5.2 08/05/2022 06:10 AM    RBC 5.15 08/05/2022 06:10 AM    HGB 15.0 08/05/2022 06:10 AM    HCT 44.3 08/05/2022 06:10 AM    MCV 86.0 08/05/2022 06:10 AM    RDW 11.9 08/05/2022 06:10 AM     08/05/2022 06:10 AM       CMP: No results found for: NA, K, CL, CO2, BUN, CREATININE, GFRAA, AGRATIO, LABGLOM, GLUCOSE, GLU, PROT, CALCIUM, BILITOT, ALKPHOS, AST, ALT    POC Tests: No results for input(s): POCGLU, POCNA, POCK, POCCL, POCBUN, POCHEMO, POCHCT in the last 72 hours. Coags: No results found for: PROTIME, INR, APTT    HCG (If Applicable): No results found for: PREGTESTUR, PREGSERUM, HCG, HCGQUANT     ABGs: No results found for: PHART, PO2ART, ZPK0XKE, RXF7TCE, BEART, Z3HSPSIR     Type & Screen (If Applicable):  No results found for: LABABO, LABRH    Drug/Infectious Status (If Applicable):  No results found for: HIV, HEPCAB    COVID-19 Screening (If Applicable): No results found for: COVID19        Anesthesia Evaluation  Patient summary reviewed no history of anesthetic complications:   Airway: Mallampati: II  TM distance: >3 FB   Neck ROM: full  Mouth opening: > = 3 FB   Dental: normal exam         Pulmonary:Negative Pulmonary ROS breath sounds clear to auscultation      (-) not a current smoker                           Cardiovascular:Negative CV ROS            Rhythm: regular  Rate: normal                    Neuro/Psych:   Negative Neuro/Psych ROS              GI/Hepatic/Renal:             Endo/Other:    (+) hypothyroidism::., .                  ROS comment: POST MENOPAUSAL BLEEDING   PERSISTENT COMPLEX RIGHT OVARIAN CYST    Complete tear of right rotator cuff Abdominal:             Vascular: negative vascular ROS. Other Findings:           Anesthesia Plan      general     ASA 2       Induction: intravenous. MIPS: Postoperative opioids intended and Prophylactic antiemetics administered. Anesthetic plan and risks discussed with patient. Use of blood products discussed with patient whom consented to blood products. Plan discussed with CRNA.                     Cedrick Ball MD   8/5/2022

## 2022-08-05 NOTE — LETTER
[address] [address]  Phone: [phone]    No name on file. August 6, 2022     Patient: Jennifer Tirado   YOB: 1965   Date of Visit: 8/5/2022       To Whom It May Concern:    Jennifer Tirado was admitted to our unit on 8/5/2022 and discharged on 8/6/2022. She is to remain out of work until her follow up appointment with her Physician. If you have any questions or concerns, please don't hesitate to call.     Sincerely,        Fanny Adler MD/Leena Chandler RN

## 2022-08-05 NOTE — ANESTHESIA POSTPROCEDURE EVALUATION
Department of Anesthesiology  Postprocedure Note    Patient: Deejay Covarrubias  MRN: [id number]  YOB: 1965  Date of evaluation: 8/5/2022      Procedure Summary     Date: 08/05/22 Room / Location: [address] [place] [address] / [address] [place] [address]    Anesthesia Start: 6044 Anesthesia Stop: 3169    Procedure: ROBOTIC ASSISTED TOTAL LAPAROSCOPIC HYSTERECTOMY, BILATERAL SALPINGO-OOPHORECTOMY (CPT 31637) (Abdomen) Diagnosis:       Cyst of right ovary      (POST MENOPAUSAL BLEEDING, PERSISTENT COMPLEX RIGHT OVARIAN CYST)    Surgeons: Ladi Rothman MD Responsible Provider: Roland Rouse MD    Anesthesia Type: General ASA Status: 2          Anesthesia Type: General    Iza Phase I: Iza Score: 9    Iza Phase II:        Anesthesia Post Evaluation    Patient location during evaluation: PACU  Patient participation: complete - patient participated  Level of consciousness: awake  Airway patency: patent  Nausea & Vomiting: no nausea and no vomiting  Complications: no  Cardiovascular status: hemodynamically stable  Respiratory status: acceptable  Hydration status: stable

## 2022-08-06 VITALS
WEIGHT: 153 LBS | BODY MASS INDEX: 26.12 KG/M2 | HEIGHT: 64 IN | TEMPERATURE: 98.4 F | HEART RATE: 80 BPM | RESPIRATION RATE: 16 BRPM | OXYGEN SATURATION: 99 % | SYSTOLIC BLOOD PRESSURE: 122 MMHG | DIASTOLIC BLOOD PRESSURE: 67 MMHG

## 2022-08-06 PROBLEM — Z90.710 S/P LAPAROSCOPIC HYSTERECTOMY: Status: RESOLVED | Noted: 2022-08-06 | Resolved: 2022-08-06

## 2022-08-06 PROBLEM — R19.00 PELVIC MASS IN FEMALE: Status: RESOLVED | Noted: 2022-08-05 | Resolved: 2022-08-06

## 2022-08-06 PROBLEM — Z90.710 S/P LAPAROSCOPIC HYSTERECTOMY: Status: ACTIVE | Noted: 2022-08-06

## 2022-08-06 LAB
HCT VFR BLD CALC: 39.9 % (ref 34–48)
HEMOGLOBIN: 13.1 G/DL (ref 11.5–15.5)
MCH RBC QN AUTO: 28.9 PG (ref 26–35)
MCHC RBC AUTO-ENTMCNC: 32.8 % (ref 32–34.5)
MCV RBC AUTO: 88.1 FL (ref 80–99.9)
PDW BLD-RTO: 11.9 FL (ref 11.5–15)
PLATELET # BLD: 160 E9/L (ref 130–450)
PMV BLD AUTO: 11.6 FL (ref 7–12)
RBC # BLD: 4.53 E12/L (ref 3.5–5.5)
WBC # BLD: 11.2 E9/L (ref 4.5–11.5)

## 2022-08-06 PROCEDURE — G0378 HOSPITAL OBSERVATION PER HR: HCPCS

## 2022-08-06 PROCEDURE — 6360000002 HC RX W HCPCS: Performed by: LEGAL MEDICINE

## 2022-08-06 PROCEDURE — 96374 THER/PROPH/DIAG INJ IV PUSH: CPT

## 2022-08-06 PROCEDURE — 2700000000 HC OXYGEN THERAPY PER DAY

## 2022-08-06 PROCEDURE — 2500000003 HC RX 250 WO HCPCS: Performed by: LEGAL MEDICINE

## 2022-08-06 PROCEDURE — 96376 TX/PRO/DX INJ SAME DRUG ADON: CPT

## 2022-08-06 PROCEDURE — 6370000000 HC RX 637 (ALT 250 FOR IP): Performed by: LEGAL MEDICINE

## 2022-08-06 PROCEDURE — 36415 COLL VENOUS BLD VENIPUNCTURE: CPT

## 2022-08-06 PROCEDURE — 2580000003 HC RX 258: Performed by: LEGAL MEDICINE

## 2022-08-06 PROCEDURE — 96361 HYDRATE IV INFUSION ADD-ON: CPT

## 2022-08-06 PROCEDURE — 85027 COMPLETE CBC AUTOMATED: CPT

## 2022-08-06 RX ORDER — ACETAMINOPHEN 500 MG
1000 TABLET ORAL EVERY 6 HOURS
Status: DISCONTINUED | OUTPATIENT
Start: 2022-08-06 | End: 2022-08-06 | Stop reason: HOSPADM

## 2022-08-06 RX ORDER — OXYCODONE HYDROCHLORIDE 5 MG/1
5 TABLET ORAL EVERY 4 HOURS PRN
Status: DISCONTINUED | OUTPATIENT
Start: 2022-08-06 | End: 2022-08-06 | Stop reason: HOSPADM

## 2022-08-06 RX ORDER — OXYCODONE HYDROCHLORIDE 5 MG/1
10 TABLET ORAL EVERY 4 HOURS PRN
Status: DISCONTINUED | OUTPATIENT
Start: 2022-08-06 | End: 2022-08-06 | Stop reason: HOSPADM

## 2022-08-06 RX ADMIN — OXYCODONE 10 MG: 5 TABLET ORAL at 14:43

## 2022-08-06 RX ADMIN — SODIUM CHLORIDE, POTASSIUM CHLORIDE, SODIUM LACTATE AND CALCIUM CHLORIDE: 600; 310; 30; 20 INJECTION, SOLUTION INTRAVENOUS at 01:20

## 2022-08-06 RX ADMIN — HYDROMORPHONE HYDROCHLORIDE 0.5 MG: 1 INJECTION, SOLUTION INTRAMUSCULAR; INTRAVENOUS; SUBCUTANEOUS at 01:26

## 2022-08-06 RX ADMIN — METRONIDAZOLE 500 MG: 500 INJECTION, SOLUTION INTRAVENOUS at 01:21

## 2022-08-06 RX ADMIN — ACETAMINOPHEN 1000 MG: 500 TABLET ORAL at 08:17

## 2022-08-06 RX ADMIN — HYDROMORPHONE HYDROCHLORIDE 0.5 MG: 1 INJECTION, SOLUTION INTRAMUSCULAR; INTRAVENOUS; SUBCUTANEOUS at 06:48

## 2022-08-06 RX ADMIN — OXYCODONE 5 MG: 5 TABLET ORAL at 10:42

## 2022-08-06 ASSESSMENT — PAIN DESCRIPTION - LOCATION
LOCATION: ABDOMEN
LOCATION: ABDOMEN;PELVIS;PERINEUM
LOCATION: INCISION
LOCATION: ABDOMEN;PELVIS;PERINEUM
LOCATION: ABDOMEN

## 2022-08-06 ASSESSMENT — PAIN DESCRIPTION - DESCRIPTORS
DESCRIPTORS: ACHING
DESCRIPTORS: SORE
DESCRIPTORS: SORE

## 2022-08-06 ASSESSMENT — PAIN SCALES - GENERAL
PAINLEVEL_OUTOF10: 7
PAINLEVEL_OUTOF10: 4
PAINLEVEL_OUTOF10: 4
PAINLEVEL_OUTOF10: 6

## 2022-08-06 ASSESSMENT — PAIN DESCRIPTION - PAIN TYPE: TYPE: SURGICAL PAIN

## 2022-08-06 ASSESSMENT — PAIN DESCRIPTION - ORIENTATION
ORIENTATION: MID
ORIENTATION: MID

## 2022-08-06 NOTE — DISCHARGE SUMMARY
[address]                               DISCHARGE SUMMARY    PATIENT NAME: Audelia Ramos                    :        1965  MED REC NO:   [id number]                            ROOM:       0768  ACCOUNT NO:   [de-identified]                           ADMIT DATE: 2022. .. PROVIDER:     Srinivas Manuel MD                  DISCHARGE DATE:      ADMITTING DIAGNOSES:  Postmenopausal bleeding, bloating, fibroid uterus,  persistent 0.5 cm right adnexal mass, grade 2 uterine prolapse, and  enterocele. DISCHARGE DIAGNOSES:  Postmenopausal bleeding, bloating, fibroid uterus,  persistent 0.5 cm right adnexal mass, grade 2 uterine prolapse,  enterocele as well as grade II cystocele. PROCEDURES PERFORMED:  Robot-assisted total laparoscopic hysterectomy,  bilateral salpingo-oophorectomy, enterocele repair, anterior  colporrhaphy, uterosacral ligament vault suspension, cystoscopy. HOSPITAL COURSE IN DETAIL:  The patient is doing well. No nausea or  vomiting. Pain is under adequate control. No chest pain or shortness  of breath. No calf pain. Her admission labs revealed a white count of  5.2, hemoglobin 15, platelets 769,884. Recovery room CBC revealed a  white count of 9.9, hemoglobin 12.3, platelets 093,548. Postop day #1  CBC revealed a white count of 11.2, hemoglobin 13.1, platelets 179,321. She is afebrile. Heart rate 91-79. Blood pressure 120-133/65-60. O2  sat 99% on room air. Urine output is 650-1500 mL per shift. Urine is  clear in the tubing. Lungs are clear to auscultation. CV:  Regular  rate and rhythm. Abdomen:  Obese, soft, nondistended, appropriately  tender. No rebound or guarding. Incisions dry and intact. Perineum  dry and intact. Extremities:  No clubbing, cyanosis, or edema. No  cords or erythema. ASSESSMENT:  The patient is doing well, postop day #1. Stable.     PLAN:

## 2022-08-06 NOTE — OP NOTE
or lateral fibular head. The patient's vagina  and perineum were prepped with Betadine. Abdomen was prepped with  DuraPrep. The patient was grounded. The patient's arms were cradled in  cotton padding and tucked gently at her sides with care to avoid injury  to the ulnar or brachial nerves. The patient was secured to the table  in order to avoid slippage during steep Trendelenburg position. The  patient was draped with sterile drapes. Rivas catheter was placed in  the bladder. Anterior lip of cervix was grasped. Uterus was sounded 10  cm. Cervix was serially dilated and to allow insertion of a ELBERT tip  Koh cup and pneumo-occluder. Surgeon's gloves were changed. Attention  was brought to the abdomen. Small supraumbilical incision was made. Anterior abdominal wall was sharply tented and a Veress needle was  directed into the abdominal cavity at a 45-degree angle. Correct  placement was ensured with the water drop test.  3 liters of CO2 gas was  insufflated into the abdomen. Veress needle was discarded. The  anterior abdominal wall was sharply tented and an 8-mm trocar was  directed into the abdominal cavity at a 45-degree angle. Correct  placement was ensured with the laparoscope. Under direct visualization,  8-mm trocars were placed in the right and left lower quadrant and right  upper quadrant and 10-mm trocar in the left upper quadrant with care to  avoid the obliterated umbilical artery and inferior epigastric vessels. Pelvic findings were as previously indicated. Robot was docked and  proper functioning of all arms was assured. I then presented to the  surgical console. The assistant was instructed to push the uterus  cephalad throughout the course of the case in order to minimize the  chances of lateral thermal spread of the energy source. Course of the  ureters was identified bilaterally. Utero-ovarian ligaments and round  ligaments were ligated using the vessel sealer.   Incision over the  posterior broad ligament was carried out over the uterosacral ligaments,  allowing downward displacement of peritoneum and ureters. Bladder flap  was carefully made. Anterior and posterior colpotomy incisions were  made. Uterine arteries were skeletonized and ligated using the vessel  sealer. Colpotomy incision was completed. IP ligaments were ligated  bilaterally. All specimens were removed through the vaginal orifice. Pelvic sidewalls were opened. Peritoneum was dissected away from the  vagina anteriorly and posteriorly. Bladder flap was extended  anteriorly. Uterosacral ligament vault suspension sutures were then  placed at the level of the sacral spine and attached to the vaginal cuff  bilaterally via three separate sutures. Pouch of Fito Ethiopian was incised  between the left and right uterosacral ligaments and the rectovaginal  space. This was dissected along the posterior vaginal wall. The  lateral aspect of the anterior vagina was then sutured back to its  original point of attachment at the arcus tendineus fascia pelvis  using two separate sutures with good support of the cuff noted. Pubocervical fascia was then reapproximated to the pericervical ring to  repair the enterocele. Anteriorly, excess vaginal mucosa was resected, and the vaginal walls were re approximated centrally to repair the central cystocele. Vaginal cuff was reapproximated in interrupted  fashion with care to incorporate the uterosacral ligaments into the angle of the cuff. Intra-abdominal pressure was reduced and all pedicles were  examined, were hemostatic. Intra-abdominal pressure was brought back  up. All instruments were removed under direct visualization. Robot was  undocked and all trocars removed under direct visualization after CO2  gas was allowed to escape from the abdominal cavity. Skin incisions  were reapproximated in a running fashion. Attention was brought to the  bladder.   A 30-degree cystoscope was advanced into the bladder. The  urethra contracted appropriately. Yellow plumes of urine were noted  ejecting from each ureteral orifice. Bladder wall was unremarkable. Cystoscope was removed. Rivas catheter was replaced. Vaginal vault was  examined and was hemostatic. Good support of the vaginal cuff was  noted. Sponge, lap, needle counts were correct. There were no  complications. The patient tolerated the procedure well and went to  recovery room in stable condition.         Allen Jacobson MD    D: 08/05/2022 20:53:45       T: 08/05/2022 20:57:19     [email or web address]  Job#: [id number]     Doc#: [id number]    CC:

## 2022-09-20 ENCOUNTER — HOSPITAL ENCOUNTER (OUTPATIENT)
Dept: ULTRASOUND IMAGING | Age: 57
Discharge: HOME | End: 2022-09-20
Payer: COMMERCIAL

## 2022-09-20 DIAGNOSIS — R10.31 ABDOMINAL PAIN, RIGHT LOWER QUADRANT: ICD-10-CM

## 2022-09-20 DIAGNOSIS — R10.32 ABDOMINAL PAIN, LEFT LOWER QUADRANT: ICD-10-CM

## 2022-09-20 PROCEDURE — 76705 ECHO EXAM OF ABDOMEN: CPT

## 2022-11-07 ENCOUNTER — TELEPHONE (OUTPATIENT)
Dept: FAMILY MEDICINE CLINIC | Age: 57
End: 2022-11-07

## 2022-11-07 NOTE — TELEPHONE ENCOUNTER
----- Message from [facility] sent at 11/7/2022 11:57 AM EST -----  Subject: Appointment Request    Reason for Call: New Patient/New to Provider Appointment needed: New   Patient Request Appointment    QUESTIONS    Reason for appointment request? No appointments available during search     Additional Information for Provider? would like to become established in   [facility] . will need script refills by end of year please call to   schedule as NEW to practice.  IF Cannot get in with Dr Haim Mckeon please   advise as to other providers available   ---------------------------------------------------------------------------  --------------  [address] [employer]  [phone]; OK to leave message on voicemail,Do not leave any message,   patient will call back for answer  ---------------------------------------------------------------------------  --------------  SCRIPT ANSWERS  ANNIKA Screen: Darci Brownlee

## 2022-11-07 NOTE — TELEPHONE ENCOUNTER
Called pt and pt isn't sure who she wants to see. She said she'll call back if she wants to schedule here.

## 2022-11-09 PROBLEM — G56.01 CARPAL TUNNEL SYNDROME OF RIGHT WRIST: Status: ACTIVE | Noted: 2022-04-07

## 2022-11-09 PROBLEM — K21.9 GASTROESOPHAGEAL REFLUX DISEASE WITHOUT ESOPHAGITIS: Status: ACTIVE | Noted: 2022-04-21

## 2022-11-09 PROBLEM — E06.3 HASHIMOTO'S THYROIDITIS: Status: ACTIVE | Noted: 2022-04-21

## 2022-11-09 PROBLEM — H91.90 CHRONIC DEAFNESS: Status: ACTIVE | Noted: 2019-08-19

## 2022-11-09 PROBLEM — E78.5 HYPERLIPIDEMIA: Status: ACTIVE | Noted: 2022-11-09

## 2022-11-09 RX ORDER — FLUTICASONE PROPIONATE 50 MCG
SPRAY, SUSPENSION (ML) NASAL
COMMUNITY
Start: 2022-09-09

## 2022-11-09 RX ORDER — SIMVASTATIN 10 MG
TABLET ORAL
COMMUNITY
Start: 2022-10-05

## 2022-11-11 ENCOUNTER — OFFICE VISIT (OUTPATIENT)
Dept: PRIMARY CARE CLINIC | Age: 57
End: 2022-11-11
Payer: COMMERCIAL

## 2022-11-11 VITALS
BODY MASS INDEX: 26.12 KG/M2 | OXYGEN SATURATION: 98 % | SYSTOLIC BLOOD PRESSURE: 135 MMHG | WEIGHT: 153 LBS | TEMPERATURE: 98.1 F | DIASTOLIC BLOOD PRESSURE: 85 MMHG | HEART RATE: 92 BPM | HEIGHT: 64 IN

## 2022-11-11 DIAGNOSIS — E78.5 HYPERLIPIDEMIA, UNSPECIFIED HYPERLIPIDEMIA TYPE: ICD-10-CM

## 2022-11-11 DIAGNOSIS — E03.9 HYPOTHYROIDISM, UNSPECIFIED TYPE: ICD-10-CM

## 2022-11-11 DIAGNOSIS — E03.9 HYPOTHYROIDISM, UNSPECIFIED TYPE: Primary | ICD-10-CM

## 2022-11-11 LAB
T4 FREE: 1.57 NG/DL (ref 0.93–1.7)
TSH SERPL DL<=0.05 MIU/L-ACNC: 0.05 UIU/ML (ref 0.27–4.2)

## 2022-11-11 PROCEDURE — 99203 OFFICE O/P NEW LOW 30 MIN: CPT | Performed by: STUDENT IN AN ORGANIZED HEALTH CARE EDUCATION/TRAINING PROGRAM

## 2022-11-11 SDOH — ECONOMIC STABILITY: FOOD INSECURITY: WITHIN THE PAST 12 MONTHS, YOU WORRIED THAT YOUR FOOD WOULD RUN OUT BEFORE YOU GOT MONEY TO BUY MORE.: NEVER TRUE

## 2022-11-11 SDOH — ECONOMIC STABILITY: FOOD INSECURITY: WITHIN THE PAST 12 MONTHS, THE FOOD YOU BOUGHT JUST DIDN'T LAST AND YOU DIDN'T HAVE MONEY TO GET MORE.: NEVER TRUE

## 2022-11-11 ASSESSMENT — PATIENT HEALTH QUESTIONNAIRE - PHQ9
SUM OF ALL RESPONSES TO PHQ QUESTIONS 1-9: 0
SUM OF ALL RESPONSES TO PHQ QUESTIONS 1-9: 0
1. LITTLE INTEREST OR PLEASURE IN DOING THINGS: 0
SUM OF ALL RESPONSES TO PHQ QUESTIONS 1-9: 0
SUM OF ALL RESPONSES TO PHQ9 QUESTIONS 1 & 2: 0
2. FEELING DOWN, DEPRESSED OR HOPELESS: 0
SUM OF ALL RESPONSES TO PHQ QUESTIONS 1-9: 0

## 2022-11-11 ASSESSMENT — ENCOUNTER SYMPTOMS
RESPIRATORY NEGATIVE: 1
EYES NEGATIVE: 1
GASTROINTESTINAL NEGATIVE: 1

## 2022-11-11 ASSESSMENT — SOCIAL DETERMINANTS OF HEALTH (SDOH): HOW HARD IS IT FOR YOU TO PAY FOR THE VERY BASICS LIKE FOOD, HOUSING, MEDICAL CARE, AND HEATING?: NOT HARD AT ALL

## 2022-11-11 NOTE — PROGRESS NOTES
Silvia Jason (:  1965) is a 62 y.o. female,New patient, here for evaluation of the following chief complaint(s):  Established New Doctor (She needs a new PCP and needs updated labs Thyroid.)         ASSESSMENT/PLAN:  1. Hypothyroidism, unspecified type  -     TSH; Future  -     T4, Free; Future  2. Hyperlipidemia, unspecified hyperlipidemia type    Return in about 3 months (around 2023). Will obtain prior c-scrope and mammogram     Subjective   SUBJECTIVE/OBJECTIVE:  HPI    Patient is a 61 y/o F with a PMHx of hearing impairment, HLD and hypothyroidism who presents to establish care. History is obtained with help of a video . She has had labs drawn this year and are in the system for review; thyroid labs were drawn in July and TSH slight suppressed; she reports that her levels have been fluctuating and medication adjustments have been made    Follows with Dr. Home Tran and underwent a GERRI in August; Dr. Home Tran orders her mammograms and she reports these have all been normal     FMHx: diabetes, CAD    SGHX: GERRI, foot surgery, tonsillectomy     Social: former smoker, quit in     Reportedly had a colonoscopy done at age 48 and was reportedly normal- had this done on [employer]    Review of Systems   Constitutional: Negative. HENT:  Positive for hearing loss. Eyes: Negative. Respiratory: Negative. Cardiovascular: Negative. Gastrointestinal: Negative. Endocrine: Negative. Genitourinary: Negative. Skin: Negative. Neurological: Negative. Psychiatric/Behavioral: Negative. Objective   Physical Exam  Vitals reviewed. Constitutional:       General: She is not in acute distress. Appearance: Normal appearance. Comments: Patient is deaf   HENT:      Head: Normocephalic and atraumatic. Mouth/Throat:      Mouth: Mucous membranes are moist.      Pharynx: Oropharynx is clear. Eyes:      General:         Right eye: No discharge. Left eye: No discharge. Cardiovascular:      Rate and Rhythm: Normal rate and regular rhythm. Pulses: Normal pulses. Heart sounds: Normal heart sounds. Pulmonary:      Effort: Pulmonary effort is normal.      Breath sounds: Normal breath sounds. Abdominal:      General: There is no distension. Palpations: Abdomen is soft. Tenderness: There is no abdominal tenderness. Musculoskeletal:      Cervical back: Neck supple. No tenderness. Lymphadenopathy:      Cervical: No cervical adenopathy. Skin:     General: Skin is warm and dry. Neurological:      General: No focal deficit present. Mental Status: She is alert and oriented to person, place, and time. Psychiatric:         Mood and Affect: Mood normal.         Behavior: Behavior normal.              An electronic signature was used to authenticate this note.     --Marylen Snook, MD

## 2022-11-14 NOTE — RESULT ENCOUNTER NOTE
Patient notified at home of her Thyroid level being WNL, and we will keep her dosing the same at this time.

## 2022-11-29 ENCOUNTER — TELEPHONE (OUTPATIENT)
Dept: PRIMARY CARE CLINIC | Age: 57
End: 2022-11-29

## 2022-11-29 NOTE — TELEPHONE ENCOUNTER
Population Health  faxed records request to [facility].   Requested Colonoscopy report 11/29/2022, 4:02 PM.    Joe HUNT RKerrieTKerrie (R) (T)  Clinical Outcomes Manager, [address] [place], [place] [address]adAl[facility]

## 2023-01-12 ENCOUNTER — COMMUNITY OUTREACH (OUTPATIENT)
Dept: PRIMARY CARE CLINIC | Age: 58
End: 2023-01-12

## 2023-03-03 ENCOUNTER — OFFICE VISIT (OUTPATIENT)
Dept: PRIMARY CARE CLINIC | Age: 58
End: 2023-03-03
Payer: COMMERCIAL

## 2023-03-03 VITALS
TEMPERATURE: 98.1 F | HEIGHT: 64 IN | DIASTOLIC BLOOD PRESSURE: 86 MMHG | SYSTOLIC BLOOD PRESSURE: 131 MMHG | BODY MASS INDEX: 25.27 KG/M2 | OXYGEN SATURATION: 96 % | WEIGHT: 148 LBS | HEART RATE: 81 BPM

## 2023-03-03 DIAGNOSIS — E78.5 HYPERLIPIDEMIA, UNSPECIFIED HYPERLIPIDEMIA TYPE: ICD-10-CM

## 2023-03-03 DIAGNOSIS — E03.9 HYPOTHYROIDISM, UNSPECIFIED TYPE: ICD-10-CM

## 2023-03-03 DIAGNOSIS — E03.9 HYPOTHYROIDISM, UNSPECIFIED TYPE: Primary | ICD-10-CM

## 2023-03-03 DIAGNOSIS — Z79.890 HORMONE REPLACEMENT THERAPY: ICD-10-CM

## 2023-03-03 DIAGNOSIS — F33.1 MODERATE EPISODE OF RECURRENT MAJOR DEPRESSIVE DISORDER (HCC): ICD-10-CM

## 2023-03-03 PROCEDURE — 99214 OFFICE O/P EST MOD 30 MIN: CPT | Performed by: STUDENT IN AN ORGANIZED HEALTH CARE EDUCATION/TRAINING PROGRAM

## 2023-03-03 RX ORDER — ESCITALOPRAM OXALATE 5 MG/1
5 TABLET ORAL DAILY
Qty: 60 TABLET | Refills: 0 | Status: SHIPPED | OUTPATIENT
Start: 2023-03-03 | End: 2023-05-02

## 2023-03-03 RX ORDER — SENNOSIDES 8.6 MG/1
TABLET, COATED ORAL
COMMUNITY
Start: 2022-12-26

## 2023-03-03 RX ORDER — LEVOTHYROXINE SODIUM 88 UG/1
88 TABLET ORAL DAILY
Qty: 90 TABLET | Refills: 0 | Status: SHIPPED | OUTPATIENT
Start: 2023-03-03 | End: 2023-06-01

## 2023-03-03 RX ORDER — ESTRADIOL 0.05 MG/D
FILM, EXTENDED RELEASE TRANSDERMAL
COMMUNITY
Start: 2023-01-31 | End: 2023-03-03 | Stop reason: SDUPTHER

## 2023-03-03 RX ORDER — ESTRADIOL 0.05 MG/D
FILM, EXTENDED RELEASE TRANSDERMAL
Qty: 12 PATCH | Refills: 0 | Status: SHIPPED | OUTPATIENT
Start: 2023-03-03

## 2023-03-03 RX ORDER — LIOTHYRONINE SODIUM 5 UG/1
5 TABLET ORAL 2 TIMES DAILY
Qty: 180 TABLET | Refills: 0 | Status: SHIPPED | OUTPATIENT
Start: 2023-03-03 | End: 2023-06-01

## 2023-03-03 RX ORDER — SIMVASTATIN 10 MG
TABLET ORAL
Qty: 90 TABLET | Refills: 0 | Status: SHIPPED | OUTPATIENT
Start: 2023-03-03

## 2023-03-03 SDOH — ECONOMIC STABILITY: HOUSING INSECURITY
IN THE LAST 12 MONTHS, WAS THERE A TIME WHEN YOU DID NOT HAVE A STEADY PLACE TO SLEEP OR SLEPT IN A SHELTER (INCLUDING NOW)?: NO

## 2023-03-03 SDOH — ECONOMIC STABILITY: FOOD INSECURITY: WITHIN THE PAST 12 MONTHS, YOU WORRIED THAT YOUR FOOD WOULD RUN OUT BEFORE YOU GOT MONEY TO BUY MORE.: NEVER TRUE

## 2023-03-03 SDOH — ECONOMIC STABILITY: INCOME INSECURITY: HOW HARD IS IT FOR YOU TO PAY FOR THE VERY BASICS LIKE FOOD, HOUSING, MEDICAL CARE, AND HEATING?: NOT VERY HARD

## 2023-03-03 SDOH — ECONOMIC STABILITY: FOOD INSECURITY: WITHIN THE PAST 12 MONTHS, THE FOOD YOU BOUGHT JUST DIDN'T LAST AND YOU DIDN'T HAVE MONEY TO GET MORE.: NEVER TRUE

## 2023-03-03 ASSESSMENT — PATIENT HEALTH QUESTIONNAIRE - PHQ9
SUM OF ALL RESPONSES TO PHQ QUESTIONS 1-9: 1
1. LITTLE INTEREST OR PLEASURE IN DOING THINGS: 0
SUM OF ALL RESPONSES TO PHQ QUESTIONS 1-9: 1
2. FEELING DOWN, DEPRESSED OR HOPELESS: 1
SUM OF ALL RESPONSES TO PHQ9 QUESTIONS 1 & 2: 1

## 2023-03-03 ASSESSMENT — ENCOUNTER SYMPTOMS
GASTROINTESTINAL NEGATIVE: 1
RESPIRATORY NEGATIVE: 1

## 2023-03-03 NOTE — PROGRESS NOTES
Nicolas Cheema (:  1965) is a 62 y.o. female,Established patient, here for evaluation of the following chief complaint(s):  Thyroid Problem (Would like to have bloodwork done and tdap shot, and is starting to feel a little bit of depression)         ASSESSMENT/PLAN:  1. Hypothyroidism, unspecified type  -     T4, Free; Future  -     TSH; Future  -     levothyroxine (SYNTHROID) 88 MCG tablet; Take 1 tablet by mouth Daily, Disp-90 tablet, R-0Normal  -     liothyronine (CYTOMEL) 5 MCG tablet; Take 1 tablet by mouth in the morning and at bedtime, Disp-180 tablet, R-0Normal  2. Hyperlipidemia, unspecified hyperlipidemia type  -     simvastatin (ZOCOR) 10 MG tablet; TAKE 1 TABLET BY MOUTH EVERY DAY FOR 30 DAYS, Disp-90 tablet, R-0Normal  -     LIPID PANEL; Future  3. Hormone replacement therapy  -     estradiol (VIVELLE) 0.05 MG/24HR; APPLY 1 PATCH EVERY WEEK, Disp-12 patch, R-0Normal  4. Moderate episode of recurrent major depressive disorder (HCC)  -     escitalopram (LEXAPRO) 5 MG tablet; Take 1 tablet by mouth daily, Disp-60 tablet, R-0Normal    Return in about 6 weeks (around 2023), or pleae schedule 30 minute appointment. Advised her to get tdap shot at pharmacy    Side effects of lexapro discussed and advised patient to call if she experiences any    Subjective   SUBJECTIVE/OBJECTIVE:  HPI    Patient is a 61 y/o F with a PMHx of deafness, hypothyroidism who presents for follow up.     She has no complaints today -due for repeat blood work for her thyroid-denies any signs of symptoms of thyroid dysfunction    Due for a Tdap shot    She is reporting some signs of depression-her dog  and she works in [employer] [facility] [employer] and commutes there and this takes a toll on her significant other; reports feeling down, finds she is now having some trouble sleeping, has lost some interest in some activities; arguing more with her partner and are ; no changes in appetite; denies any SI     Continues to follow with Dr. Sherry Kumar for her mammogram     Review of Systems   Constitutional: Negative. HENT: Negative. Respiratory: Negative. Cardiovascular: Negative. Gastrointestinal: Negative. Endocrine: Negative. Neurological: Negative. Psychiatric/Behavioral:  Positive for agitation and sleep disturbance. The patient is nervous/anxious. Objective   Physical Exam  Vitals reviewed. Constitutional:       General: She is not in acute distress. HENT:      Head: Normocephalic and atraumatic. Eyes:      General:         Right eye: No discharge. Left eye: No discharge. Cardiovascular:      Rate and Rhythm: Normal rate and regular rhythm. Pulses: Normal pulses. Heart sounds: Normal heart sounds. Pulmonary:      Effort: Pulmonary effort is normal.      Breath sounds: Normal breath sounds. Musculoskeletal:      Cervical back: Neck supple. No tenderness. Lymphadenopathy:      Cervical: No cervical adenopathy. Skin:     General: Skin is warm and dry. Neurological:      General: No focal deficit present. Mental Status: She is alert and oriented to person, place, and time. Psychiatric:         Mood and Affect: Mood normal.         Behavior: Behavior normal.      Comments: Appropriately tearful              An electronic signature was used to authenticate this note.     --Melva Bradford MD

## 2023-03-04 LAB
CHOLESTEROL, TOTAL: 221 MG/DL (ref 0–199)
HDLC SERPL-MCNC: 90 MG/DL
LDL CHOLESTEROL CALCULATED: 117 MG/DL (ref 0–99)
T4 FREE: 1.51 NG/DL (ref 0.93–1.7)
TRIGL SERPL-MCNC: 72 MG/DL (ref 0–149)
TSH SERPL DL<=0.05 MIU/L-ACNC: 0.04 UIU/ML (ref 0.27–4.2)
VLDLC SERPL CALC-MCNC: 14 MG/DL

## 2023-03-06 ENCOUNTER — TELEPHONE (OUTPATIENT)
Dept: PRIMARY CARE CLINIC | Age: 58
End: 2023-03-06

## 2023-03-06 NOTE — TELEPHONE ENCOUNTER
----- Message from Maria Guadalupe Osorio MD sent at 3/6/2023  7:58 AM EST -----  Cholesterol slightly elevated: total 221 (would like <200) and LDL slightly high at 117 (would like <100); this does not require any medication; recommend a low fat diet    Thyroid labs look OK

## 2023-03-29 RX ORDER — SENNOSIDES 8.6 MG/1
TABLET, COATED ORAL
Qty: 60 TABLET | Refills: 3 | Status: SHIPPED | OUTPATIENT
Start: 2023-03-29

## 2023-03-30 DIAGNOSIS — F33.1 MODERATE EPISODE OF RECURRENT MAJOR DEPRESSIVE DISORDER (HCC): ICD-10-CM

## 2023-03-30 RX ORDER — FLUTICASONE PROPIONATE 50 MCG
SPRAY, SUSPENSION (ML) NASAL
Qty: 16 G | Refills: 2 | Status: SHIPPED | OUTPATIENT
Start: 2023-03-30

## 2023-03-30 RX ORDER — ESCITALOPRAM OXALATE 5 MG/1
5 TABLET ORAL DAILY
Qty: 180 TABLET | Refills: 2 | Status: SHIPPED | OUTPATIENT
Start: 2023-03-30 | End: 2023-05-29

## 2023-04-07 ENCOUNTER — OFFICE VISIT (OUTPATIENT)
Dept: PRIMARY CARE CLINIC | Age: 58
End: 2023-04-07
Payer: COMMERCIAL

## 2023-04-07 VITALS
BODY MASS INDEX: 25.1 KG/M2 | OXYGEN SATURATION: 98 % | DIASTOLIC BLOOD PRESSURE: 70 MMHG | HEIGHT: 64 IN | HEART RATE: 76 BPM | TEMPERATURE: 97 F | WEIGHT: 147 LBS | SYSTOLIC BLOOD PRESSURE: 120 MMHG

## 2023-04-07 DIAGNOSIS — E78.5 HYPERLIPIDEMIA, UNSPECIFIED HYPERLIPIDEMIA TYPE: ICD-10-CM

## 2023-04-07 DIAGNOSIS — F33.1 MODERATE EPISODE OF RECURRENT MAJOR DEPRESSIVE DISORDER (HCC): ICD-10-CM

## 2023-04-07 DIAGNOSIS — E03.9 HYPOTHYROIDISM, UNSPECIFIED TYPE: Primary | ICD-10-CM

## 2023-04-07 PROCEDURE — 99214 OFFICE O/P EST MOD 30 MIN: CPT | Performed by: STUDENT IN AN ORGANIZED HEALTH CARE EDUCATION/TRAINING PROGRAM

## 2023-04-07 ASSESSMENT — ENCOUNTER SYMPTOMS
RESPIRATORY NEGATIVE: 1
GASTROINTESTINAL NEGATIVE: 1
EYES NEGATIVE: 1

## 2023-04-07 NOTE — PROGRESS NOTES
Manoj Estes (:  1965) is a 62 y.o. female,Established patient, here for evaluation of the following chief complaint(s):  Medication Check (Follow up on new medications. Surgery on 2023 carpal tunnel right wrist.)         ASSESSMENT/PLAN:  1. Hypothyroidism, unspecified type  -     TSH; Future  -     T4, Free; Future  2. Hyperlipidemia, unspecified hyperlipidemia type  3. Moderate episode of recurrent major depressive disorder ([address]      Return in about 4 months (around 2023). Will repeat thyroid labs prior to next visit and if stable, will start checking 2 times a year    She will call office if she feels she needs to increase her lexapro    Subjective   SUBJECTIVE/OBJECTIVE:  HPI    Patient is a 63 y/o F with a PMHx of deafness, hypothyroidism and anxiety and depression who presents for follow up. Visit conducted with aide of a video . She was started on lexapro 5 mg at last visit and she has noticed a slight improvement in her mood and sleep; she would like to wait a few more weeks before increasing the dose    She will be having carpal tunnel surgery on  with the [facility] orthopedic center; they will arrange any preop testing     Recent thyroid labs showed slightly depressed TSH but normal free t4; denies any signs or symptoms of thyroid dysfunction     Review of Systems   Constitutional: Negative. HENT: Negative. Eyes: Negative. Respiratory: Negative. Cardiovascular: Negative. Gastrointestinal: Negative. Endocrine: Negative. Genitourinary: Negative. Musculoskeletal: Negative. Skin: Negative. Psychiatric/Behavioral: Negative. Objective   Physical Exam  Vitals reviewed. Constitutional:       General: She is not in acute distress. HENT:      Head: Normocephalic and atraumatic. Eyes:      General:         Right eye: No discharge. Left eye: No discharge.    Cardiovascular:      Rate and Rhythm: Normal rate and

## 2023-04-21 DIAGNOSIS — F33.1 MODERATE EPISODE OF RECURRENT MAJOR DEPRESSIVE DISORDER (HCC): ICD-10-CM

## 2023-04-21 DIAGNOSIS — E03.9 HYPOTHYROIDISM, UNSPECIFIED TYPE: ICD-10-CM

## 2023-04-21 DIAGNOSIS — E78.5 HYPERLIPIDEMIA, UNSPECIFIED HYPERLIPIDEMIA TYPE: ICD-10-CM

## 2023-04-21 DIAGNOSIS — Z79.890 HORMONE REPLACEMENT THERAPY: ICD-10-CM

## 2023-04-21 RX ORDER — LIOTHYRONINE SODIUM 5 UG/1
5 TABLET ORAL 2 TIMES DAILY
Qty: 180 TABLET | Refills: 0 | Status: SHIPPED | OUTPATIENT
Start: 2023-04-21 | End: 2023-07-20

## 2023-04-21 RX ORDER — ESCITALOPRAM OXALATE 5 MG/1
5 TABLET ORAL DAILY
Qty: 180 TABLET | Refills: 2 | Status: SHIPPED | OUTPATIENT
Start: 2023-04-21 | End: 2023-06-20

## 2023-04-21 RX ORDER — SIMVASTATIN 10 MG
TABLET ORAL
Qty: 90 TABLET | Refills: 2 | Status: SHIPPED | OUTPATIENT
Start: 2023-04-21

## 2023-04-21 RX ORDER — LEVOTHYROXINE SODIUM 88 UG/1
88 TABLET ORAL DAILY
Qty: 90 TABLET | Refills: 2 | Status: SHIPPED | OUTPATIENT
Start: 2023-04-21 | End: 2023-07-20

## 2023-04-21 RX ORDER — FLUTICASONE PROPIONATE 50 MCG
SPRAY, SUSPENSION (ML) NASAL
Qty: 16 G | Refills: 2 | Status: SHIPPED | OUTPATIENT
Start: 2023-04-21

## 2023-04-21 RX ORDER — ESTRADIOL 0.05 MG/D
FILM, EXTENDED RELEASE TRANSDERMAL
Qty: 12 PATCH | Refills: 0 | Status: SHIPPED | OUTPATIENT
Start: 2023-04-21

## 2023-05-17 ENCOUNTER — TELEPHONE (OUTPATIENT)
Dept: PRIMARY CARE CLINIC | Age: 58
End: 2023-05-17

## 2023-05-17 DIAGNOSIS — F41.9 ANXIETY: Primary | ICD-10-CM

## 2023-05-17 RX ORDER — ESCITALOPRAM OXALATE 10 MG/1
10 TABLET ORAL DAILY
Qty: 90 TABLET | Refills: 0 | OUTPATIENT
Start: 2023-05-17

## 2023-05-17 RX ORDER — ESCITALOPRAM OXALATE 10 MG/1
10 TABLET ORAL DAILY
Qty: 90 TABLET | Refills: 1 | Status: SHIPPED | OUTPATIENT
Start: 2023-05-17

## 2023-05-17 RX ORDER — ESCITALOPRAM OXALATE 10 MG/1
10 TABLET ORAL DAILY
COMMUNITY
End: 2023-05-17

## 2023-07-23 DIAGNOSIS — E03.9 HYPOTHYROIDISM, UNSPECIFIED TYPE: ICD-10-CM

## 2023-07-24 RX ORDER — LIOTHYRONINE SODIUM 5 UG/1
TABLET ORAL
Qty: 180 TABLET | Refills: 0 | Status: SHIPPED | OUTPATIENT
Start: 2023-07-24

## 2023-08-14 DIAGNOSIS — E03.9 HYPOTHYROIDISM, UNSPECIFIED TYPE: ICD-10-CM

## 2023-08-15 LAB
T4 FREE: 1.4 NG/DL (ref 0.9–1.7)
TSH SERPL DL<=0.05 MIU/L-ACNC: 0.04 UIU/ML (ref 0.27–4.2)

## 2023-08-25 ENCOUNTER — OFFICE VISIT (OUTPATIENT)
Dept: PRIMARY CARE CLINIC | Age: 58
End: 2023-08-25
Payer: COMMERCIAL

## 2023-08-25 VITALS
OXYGEN SATURATION: 97 % | BODY MASS INDEX: 24.24 KG/M2 | HEART RATE: 71 BPM | WEIGHT: 142 LBS | SYSTOLIC BLOOD PRESSURE: 110 MMHG | TEMPERATURE: 98.3 F | DIASTOLIC BLOOD PRESSURE: 70 MMHG | HEIGHT: 64 IN

## 2023-08-25 DIAGNOSIS — E03.9 HYPOTHYROIDISM, UNSPECIFIED TYPE: Primary | ICD-10-CM

## 2023-08-25 DIAGNOSIS — F32.A ANXIETY AND DEPRESSION: ICD-10-CM

## 2023-08-25 DIAGNOSIS — F41.9 ANXIETY AND DEPRESSION: ICD-10-CM

## 2023-08-25 DIAGNOSIS — E78.5 HYPERLIPIDEMIA, UNSPECIFIED HYPERLIPIDEMIA TYPE: ICD-10-CM

## 2023-08-25 PROCEDURE — 99213 OFFICE O/P EST LOW 20 MIN: CPT | Performed by: STUDENT IN AN ORGANIZED HEALTH CARE EDUCATION/TRAINING PROGRAM

## 2023-08-25 ASSESSMENT — PATIENT HEALTH QUESTIONNAIRE - PHQ9
SUM OF ALL RESPONSES TO PHQ QUESTIONS 1-9: 0
SUM OF ALL RESPONSES TO PHQ QUESTIONS 1-9: 0
2. FEELING DOWN, DEPRESSED OR HOPELESS: 0
SUM OF ALL RESPONSES TO PHQ QUESTIONS 1-9: 0
SUM OF ALL RESPONSES TO PHQ9 QUESTIONS 1 & 2: 0
1. LITTLE INTEREST OR PLEASURE IN DOING THINGS: 0
SUM OF ALL RESPONSES TO PHQ QUESTIONS 1-9: 0

## 2023-08-25 ASSESSMENT — ENCOUNTER SYMPTOMS
GASTROINTESTINAL NEGATIVE: 1
RESPIRATORY NEGATIVE: 1

## 2023-08-25 NOTE — PROGRESS NOTES
Reyes Ruffing (:  1965) is a 62 y.o. female,Established patient, here for evaluation of the following chief complaint(s):  Follow-up (Patient does not want to take escitalopram any longer)         ASSESSMENT/PLAN:  1. Hypothyroidism, unspecified type  -     TSH; Future  -     T4, Free; Future  2. Anxiety and depression  3. Hyperlipidemia, unspecified hyperlipidemia type  -     Lipid Panel; Future  -     Comprehensive Metabolic Panel; Future  -     CBC; Future      No follow-ups on file. Will have patient start to taper off lexapro to 5 mg     Labs to be drawn prior to next visit     Subjective   SUBJECTIVE/OBJECTIVE:  HPI    Patient is a 63 y/o F with a PMHx of hypothyroidism, anxiety and deafness who presents for follow up. Interview conducted with the help of a video      Hypothyroidism- she remains on synthroid 88 mcg and cytomel 5 mcg and recent labs OK-she denies any signs or symptoms of thyroid dysfunction     She is no longer wanting to take lexapro as she feels she is doing OK with it and wants to stop it    Review of Systems   Constitutional: Negative. HENT:  Positive for hearing loss. Respiratory: Negative. Cardiovascular: Negative. Gastrointestinal: Negative. Genitourinary: Negative. Skin: Negative. Neurological: Negative. Psychiatric/Behavioral: Negative. Objective   Physical Exam  Vitals reviewed. Constitutional:       General: She is not in acute distress. HENT:      Head: Normocephalic and atraumatic. Eyes:      General:         Right eye: No discharge. Left eye: No discharge. Skin:     General: Skin is warm and dry. Neurological:      General: No focal deficit present. Mental Status: She is alert and oriented to person, place, and time. Psychiatric:         Mood and Affect: Mood normal.         Behavior: Behavior normal.              An electronic signature was used to authenticate this note.     --Aliya Wilcox,

## 2023-09-28 ENCOUNTER — TELEPHONE (OUTPATIENT)
Dept: PRIMARY CARE CLINIC | Age: 58
End: 2023-09-28

## 2023-10-02 ENCOUNTER — TELEPHONE (OUTPATIENT)
Age: 58
End: 2023-10-02

## 2023-10-02 NOTE — TELEPHONE ENCOUNTER
10/2/2023    Anamaria Warner   [phone]    No LMP recorded. Patient has had a hysterectomy. Birth Control:   None  Lump around anus pain when touched for 1 week. thinks its a hemorroid    Pain: when touching it pain 4 instructed to call with any changes/ go to emergency room   OB patient No   Nausea: No   Chills: No   Passing gas: Yes   Fever: No   Temp: n/a   Location of pain: anus when touching   Duration of pain:1 week   Pain Scale (1-10): 4   Last BM: 10/2/23   Anything taken: nothing    Did it help:n/a   Is there anything that makes it better or worse:   Bleeding:   No    Other complaints: Lump around anus pain when touched for 1 week. Pharmacy:[facility] [address]  Allergies: No Known Allergies  Patient is scheduled for 10/5/23.

## 2023-10-05 ENCOUNTER — OFFICE VISIT (OUTPATIENT)
Age: 58
End: 2023-10-05
Payer: COMMERCIAL

## 2023-10-05 VITALS
BODY MASS INDEX: 24.72 KG/M2 | HEART RATE: 65 BPM | WEIGHT: 144 LBS | DIASTOLIC BLOOD PRESSURE: 75 MMHG | SYSTOLIC BLOOD PRESSURE: 126 MMHG

## 2023-10-05 DIAGNOSIS — K64.9 ACUTE HEMORRHOID: Primary | ICD-10-CM

## 2023-10-05 PROCEDURE — 99213 OFFICE O/P EST LOW 20 MIN: CPT | Performed by: LEGAL MEDICINE

## 2023-10-05 RX ORDER — HYDROCORTISONE ACETATE 25 MG/1
25 SUPPOSITORY RECTAL 2 TIMES DAILY
Qty: 14 SUPPOSITORY | Refills: 1 | Status: SHIPPED | OUTPATIENT
Start: 2023-10-05

## 2023-10-05 RX ORDER — ESCITALOPRAM OXALATE 5 MG/1
5 TABLET ORAL DAILY
COMMUNITY
Start: 2023-09-25

## 2023-10-05 ASSESSMENT — ENCOUNTER SYMPTOMS
BLOOD IN STOOL: 0
ABDOMINAL PAIN: 0
VOMITING: 0
NAUSEA: 0
RECTAL PAIN: 0
CONSTIPATION: 0
ABDOMINAL DISTENTION: 0
DIARRHEA: 0

## 2023-10-05 NOTE — PROGRESS NOTES
none
Brady Calvo was seen today for mass. Diagnoses and all orders for this visit:    Acute hemorrhoid  -     hydrocortisone (ANUSOL-HC) 25 MG suppository; Place 1 suppository rectally 2 times daily  -     HSV PCR; Future          Return in about 6 months (around 4/1/2024), or if symptoms worsen or fail to improve.      Susan Ann MD

## 2023-10-06 LAB
HSV 1, NAAT: NOT DETECTED
HSV 2, NAAT: NOT DETECTED
SOURCE: NORMAL

## 2023-10-29 DIAGNOSIS — E03.9 HYPOTHYROIDISM, UNSPECIFIED TYPE: ICD-10-CM

## 2023-10-30 RX ORDER — LIOTHYRONINE SODIUM 5 UG/1
TABLET ORAL
Qty: 180 TABLET | Refills: 1 | Status: SHIPPED | OUTPATIENT
Start: 2023-10-30

## 2023-11-15 ENCOUNTER — OFFICE VISIT (OUTPATIENT)
Dept: PRIMARY CARE CLINIC | Age: 58
End: 2023-11-15
Payer: COMMERCIAL

## 2023-11-15 VITALS
WEIGHT: 147.3 LBS | HEART RATE: 112 BPM | HEIGHT: 64 IN | OXYGEN SATURATION: 97 % | TEMPERATURE: 98.2 F | SYSTOLIC BLOOD PRESSURE: 118 MMHG | BODY MASS INDEX: 25.15 KG/M2 | DIASTOLIC BLOOD PRESSURE: 80 MMHG

## 2023-11-15 DIAGNOSIS — R05.2 SUBACUTE COUGH: Primary | ICD-10-CM

## 2023-11-15 DIAGNOSIS — J01.11 ACUTE RECURRENT FRONTAL SINUSITIS: ICD-10-CM

## 2023-11-15 PROCEDURE — 99213 OFFICE O/P EST LOW 20 MIN: CPT | Performed by: STUDENT IN AN ORGANIZED HEALTH CARE EDUCATION/TRAINING PROGRAM

## 2023-11-15 RX ORDER — BROMPHENIRAMINE MALEATE, PSEUDOEPHEDRINE HYDROCHLORIDE, AND DEXTROMETHORPHAN HYDROBROMIDE 2; 30; 10 MG/5ML; MG/5ML; MG/5ML
5 SYRUP ORAL 4 TIMES DAILY PRN
Qty: 473 ML | Refills: 0 | Status: SHIPPED | OUTPATIENT
Start: 2023-11-15

## 2023-11-15 RX ORDER — METHYLPREDNISOLONE 4 MG/1
TABLET ORAL
Qty: 1 KIT | Refills: 0 | Status: SHIPPED | OUTPATIENT
Start: 2023-11-15 | End: 2023-11-21

## 2023-11-15 RX ORDER — AZITHROMYCIN 500 MG/1
500 TABLET, FILM COATED ORAL DAILY
Qty: 3 TABLET | Refills: 0 | Status: SHIPPED | OUTPATIENT
Start: 2023-11-15 | End: 2023-11-18

## 2023-11-15 ASSESSMENT — ENCOUNTER SYMPTOMS
GASTROINTESTINAL NEGATIVE: 1
COUGH: 1
SORE THROAT: 1

## 2023-11-15 NOTE — PROGRESS NOTES
Xu Vaca (:  1965) is a 62 y.o. female,Established patient, here for evaluation of the following chief complaint(s): Other (3 weeks ago flu but still feeling like cold symptom headache cough drainage )         ASSESSMENT/PLAN:  1. Subacute cough  -     methylPREDNISolone (MEDROL DOSEPACK) 4 MG tablet; Take by mouth., Disp-1 kit, R-0Normal  -     azithromycin (ZITHROMAX) 500 MG tablet; Take 1 tablet by mouth daily for 3 days, Disp-3 tablet, R-0Normal  -     brompheniramine-pseudoephedrine-DM 2-30-10 MG/5ML syrup; Take 5 mLs by mouth 4 times daily as needed for Congestion or Cough, Disp-473 mL, R-0Normal  2. Acute recurrent frontal sinusitis  -     methylPREDNISolone (MEDROL DOSEPACK) 4 MG tablet; Take by mouth., Disp-1 kit, R-0Normal  -     azithromycin (ZITHROMAX) 500 MG tablet; Take 1 tablet by mouth daily for 3 days, Disp-3 tablet, R-0Normal  -     brompheniramine-pseudoephedrine-DM 2-30-10 MG/5ML syrup; Take 5 mLs by mouth 4 times daily as needed for Congestion or Cough, Disp-473 mL, R-0Normal      No follow-ups on file. Subjective   SUBJECTIVE/OBJECTIVE:  HPI    Patient is a 61 y/o F who presents for post flu like symptoms. Patient is deaf and so visit performed with aide of video      She had the flu 3 weeks ago with congestion, cough and fatigue. She is feeling a bit better but she continues to struggle with a non productive cough, congestion, post nasal drip; she hs had a headache; no fever, chills, wheezing, SOA; she has been taking Nyquil, tylenol without relief     Review of Systems   Constitutional: Negative. HENT:  Positive for congestion, postnasal drip and sore throat. Respiratory:  Positive for cough. Cardiovascular: Negative. Gastrointestinal: Negative. Endocrine: Negative. Genitourinary: Negative. Skin: Negative. Neurological:  Positive for headaches. Objective   Physical Exam  Vitals reviewed.    Constitutional:       General:

## 2023-11-25 ENCOUNTER — HOSPITAL ENCOUNTER (EMERGENCY)
Age: 58
Discharge: HOME | End: 2023-11-25
Payer: COMMERCIAL

## 2023-11-25 ENCOUNTER — APPOINTMENT (OUTPATIENT)
Dept: GENERAL RADIOLOGY | Age: 58
End: 2023-11-25
Payer: COMMERCIAL

## 2023-11-25 VITALS
BODY MASS INDEX: 24.03 KG/M2 | OXYGEN SATURATION: 98 % | HEART RATE: 84 BPM | TEMPERATURE: 98.1 F | RESPIRATION RATE: 20 BRPM | DIASTOLIC BLOOD PRESSURE: 86 MMHG | WEIGHT: 140 LBS | SYSTOLIC BLOOD PRESSURE: 143 MMHG

## 2023-11-25 DIAGNOSIS — S01.81XA CHIN LACERATION, INITIAL ENCOUNTER: Primary | ICD-10-CM

## 2023-11-25 DIAGNOSIS — S00.83XA CONTUSION OF FACE, INITIAL ENCOUNTER: ICD-10-CM

## 2023-11-25 PROCEDURE — 70110 X-RAY EXAM OF JAW 4/> VIEWS: CPT

## 2023-11-25 ASSESSMENT — PAIN SCALES - GENERAL: PAINLEVEL_OUTOF10: 9

## 2023-11-25 ASSESSMENT — PAIN - FUNCTIONAL ASSESSMENT: PAIN_FUNCTIONAL_ASSESSMENT: 0-10

## 2023-11-26 NOTE — DISCHARGE INSTRUCTIONS
Your x-ray is not read yet. Check on [facility] for the result you can also call back into urgent care for the result.   If you have further symptoms or worsening symptoms go to the emergency department

## 2023-12-28 DIAGNOSIS — E78.5 HYPERLIPIDEMIA, UNSPECIFIED HYPERLIPIDEMIA TYPE: ICD-10-CM

## 2023-12-28 DIAGNOSIS — E03.9 HYPOTHYROIDISM, UNSPECIFIED TYPE: ICD-10-CM

## 2023-12-28 RX ORDER — LIOTHYRONINE SODIUM 5 UG/1
TABLET ORAL
Qty: 180 TABLET | Refills: 1 | Status: SHIPPED | OUTPATIENT
Start: 2023-12-28

## 2023-12-28 RX ORDER — SIMVASTATIN 10 MG
TABLET ORAL
Qty: 90 TABLET | Refills: 2 | Status: SHIPPED | OUTPATIENT
Start: 2023-12-28

## 2023-12-28 RX ORDER — LEVOTHYROXINE SODIUM 88 UG/1
88 TABLET ORAL DAILY
Qty: 90 TABLET | Refills: 2 | Status: SHIPPED | OUTPATIENT
Start: 2023-12-28 | End: 2024-09-23

## 2024-02-26 DIAGNOSIS — M79.641 RIGHT HAND PAIN: Primary | ICD-10-CM

## 2024-03-04 ENCOUNTER — OFFICE VISIT (OUTPATIENT)
Dept: ORTHOPEDIC SURGERY | Age: 59
End: 2024-03-04
Payer: COMMERCIAL

## 2024-03-04 VITALS — BODY MASS INDEX: 23.05 KG/M2 | TEMPERATURE: 98 F | WEIGHT: 135 LBS | HEIGHT: 64 IN

## 2024-03-04 DIAGNOSIS — M67.441 DIGITAL MUCOUS CYST OF FINGER OF RIGHT HAND: Primary | ICD-10-CM

## 2024-03-04 DIAGNOSIS — M67.431 GANGLION CYST OF VOLAR ASPECT OF RIGHT WRIST: ICD-10-CM

## 2024-03-04 PROCEDURE — 99213 OFFICE O/P EST LOW 20 MIN: CPT | Performed by: ORTHOPAEDIC SURGERY

## 2024-03-04 NOTE — PROGRESS NOTES
flexion wnl/ extension wnl , PIP flexion wnl/ extension wnl, DIP flexion wnl/ extension wnl.  There is not rotational deformity.    There is no masses or adenopathy in bilateral upper extremities.  Radial pulses are 2+ and symmetric bilaterally.  Capillary refill is intact and < 2 seconds.  Motor strength is 5/5 with flexion and extension of the small finger joints. Small mucous cyst on dorsal side of thumb. Volar ganglion cyst of wrist.     Right:  Phallens sign negative, Tinnells sign negative, Median nerve compression test negative ,  Finklesteins negative, CMC Grind test negative, Piano Key Test negative.     Left:  Phallens sign negative, Tinnells sign negative, Median nerve compression test negative ,  Finklesteins negative, CMC Grind test negative, Piano Key Test negative.    Xrays: Arthritis of interphalangeal joint thumb    Radiographic findings reviewed with patient    Impression:   Encounter Diagnoses   Name Primary?    Digital mucous cyst of finger of right hand Yes    Ganglion cyst of volar aspect of right wrist        Plan: Natural history and expected course discussed. Questions answered.  Educational materials distributed.  Rest, ice, compression, and elevation (RICE) therapy.  Reduction in offending activity discussed.  OTC analgesics as needed.    Patient would like to proceed with digital mucous cyst removal right hand and volar ganglion right hand 6/21/24

## 2024-03-29 DIAGNOSIS — E78.5 HYPERLIPIDEMIA, UNSPECIFIED HYPERLIPIDEMIA TYPE: ICD-10-CM

## 2024-03-29 DIAGNOSIS — E03.9 HYPOTHYROIDISM, UNSPECIFIED TYPE: ICD-10-CM

## 2024-03-29 LAB
HCT VFR BLD CALC: 43.7 % (ref 34–48)
HEMOGLOBIN: 14.3 G/DL (ref 11.5–15.5)
MCH RBC QN AUTO: 29.1 PG (ref 26–35)
MCHC RBC AUTO-ENTMCNC: 32.7 G/DL (ref 32–34.5)
MCV RBC AUTO: 88.8 FL (ref 80–99.9)
PDW BLD-RTO: 11.9 % (ref 11.5–15)
PLATELET # BLD: 179 K/UL (ref 130–450)
PMV BLD AUTO: 11.4 FL (ref 7–12)
RBC # BLD: 4.92 M/UL (ref 3.5–5.5)
WBC # BLD: 4.5 K/UL (ref 4.5–11.5)

## 2024-03-30 LAB
ALBUMIN SERPL-MCNC: 4.5 G/DL (ref 3.5–5.2)
ALP BLD-CCNC: 95 U/L (ref 35–104)
ALT SERPL-CCNC: 17 U/L (ref 0–32)
ANION GAP SERPL CALCULATED.3IONS-SCNC: 15 MMOL/L (ref 7–16)
AST SERPL-CCNC: 55 U/L (ref 0–31)
BILIRUB SERPL-MCNC: 0.7 MG/DL (ref 0–1.2)
BUN BLDV-MCNC: 12 MG/DL (ref 6–20)
CALCIUM SERPL-MCNC: 9.9 MG/DL (ref 8.6–10.2)
CHLORIDE BLD-SCNC: 104 MMOL/L (ref 98–107)
CHOLESTEROL: 185 MG/DL
CO2: 23 MMOL/L (ref 22–29)
CREAT SERPL-MCNC: 0.8 MG/DL (ref 0.5–1)
GFR SERPL CREATININE-BSD FRML MDRD: >90 ML/MIN/1.73M2
GLUCOSE BLD-MCNC: 83 MG/DL (ref 74–99)
HDLC SERPL-MCNC: 67 MG/DL
LDL CHOLESTEROL: 103 MG/DL
POTASSIUM SERPL-SCNC: 4.5 MMOL/L (ref 3.5–5)
SODIUM BLD-SCNC: 142 MMOL/L (ref 132–146)
TOTAL PROTEIN: 6.8 G/DL (ref 6.4–8.3)
TRIGL SERPL-MCNC: 73 MG/DL
TSH SERPL DL<=0.05 MIU/L-ACNC: 0.03 UIU/ML (ref 0.27–4.2)
VLDLC SERPL CALC-MCNC: 15 MG/DL

## 2024-03-31 LAB — T4 FREE: 1.5 NG/DL (ref 0.9–1.7)

## 2024-04-04 SDOH — HEALTH STABILITY: PHYSICAL HEALTH: ON AVERAGE, HOW MANY MINUTES DO YOU ENGAGE IN EXERCISE AT THIS LEVEL?: 20 MIN

## 2024-04-05 ENCOUNTER — OFFICE VISIT (OUTPATIENT)
Dept: PRIMARY CARE CLINIC | Age: 59
End: 2024-04-05
Payer: COMMERCIAL

## 2024-04-05 ENCOUNTER — TELEPHONE (OUTPATIENT)
Dept: VASCULAR SURGERY | Age: 59
End: 2024-04-05

## 2024-04-05 VITALS
SYSTOLIC BLOOD PRESSURE: 118 MMHG | DIASTOLIC BLOOD PRESSURE: 78 MMHG | HEIGHT: 64 IN | WEIGHT: 142.9 LBS | TEMPERATURE: 98 F | BODY MASS INDEX: 24.4 KG/M2 | OXYGEN SATURATION: 96 % | HEART RATE: 65 BPM

## 2024-04-05 DIAGNOSIS — I83.93 VARICOSE VEINS OF BOTH LOWER EXTREMITIES, UNSPECIFIED WHETHER COMPLICATED: ICD-10-CM

## 2024-04-05 DIAGNOSIS — M67.40 GANGLION CYST: ICD-10-CM

## 2024-04-05 DIAGNOSIS — E78.5 HYPERLIPIDEMIA, UNSPECIFIED HYPERLIPIDEMIA TYPE: ICD-10-CM

## 2024-04-05 DIAGNOSIS — E03.9 HYPOTHYROIDISM, UNSPECIFIED TYPE: Primary | ICD-10-CM

## 2024-04-05 DIAGNOSIS — Z01.419 WELL FEMALE EXAM WITH ROUTINE GYNECOLOGICAL EXAM: ICD-10-CM

## 2024-04-05 DIAGNOSIS — F33.1 MODERATE EPISODE OF RECURRENT MAJOR DEPRESSIVE DISORDER (HCC): ICD-10-CM

## 2024-04-05 PROCEDURE — 99215 OFFICE O/P EST HI 40 MIN: CPT | Performed by: INTERNAL MEDICINE

## 2024-04-05 RX ORDER — FLUTICASONE PROPIONATE 50 MCG
SPRAY, SUSPENSION (ML) NASAL
Qty: 16 G | Refills: 2 | Status: SHIPPED | OUTPATIENT
Start: 2024-04-05

## 2024-04-05 RX ORDER — SIMVASTATIN 10 MG
TABLET ORAL
Qty: 270 TABLET | Refills: 0 | Status: SHIPPED | OUTPATIENT
Start: 2024-04-05

## 2024-04-05 RX ORDER — LEVOTHYROXINE SODIUM 88 UG/1
88 TABLET ORAL DAILY
Qty: 90 TABLET | Refills: 0 | Status: SHIPPED | OUTPATIENT
Start: 2024-04-05 | End: 2024-12-31

## 2024-04-05 RX ORDER — LIOTHYRONINE SODIUM 5 UG/1
TABLET ORAL
Qty: 180 TABLET | Refills: 1 | Status: SHIPPED | OUTPATIENT
Start: 2024-04-05

## 2024-04-05 ASSESSMENT — PATIENT HEALTH QUESTIONNAIRE - PHQ9
SUM OF ALL RESPONSES TO PHQ QUESTIONS 1-9: 3
SUM OF ALL RESPONSES TO PHQ QUESTIONS 1-9: 3
7. TROUBLE CONCENTRATING ON THINGS, SUCH AS READING THE NEWSPAPER OR WATCHING TELEVISION: NOT AT ALL
10. IF YOU CHECKED OFF ANY PROBLEMS, HOW DIFFICULT HAVE THESE PROBLEMS MADE IT FOR YOU TO DO YOUR WORK, TAKE CARE OF THINGS AT HOME, OR GET ALONG WITH OTHER PEOPLE: NOT DIFFICULT AT ALL
SUM OF ALL RESPONSES TO PHQ QUESTIONS 1-9: 3
4. FEELING TIRED OR HAVING LITTLE ENERGY: NOT AT ALL
6. FEELING BAD ABOUT YOURSELF - OR THAT YOU ARE A FAILURE OR HAVE LET YOURSELF OR YOUR FAMILY DOWN: NOT AT ALL
1. LITTLE INTEREST OR PLEASURE IN DOING THINGS: SEVERAL DAYS
5. POOR APPETITE OR OVEREATING: SEVERAL DAYS
8. MOVING OR SPEAKING SO SLOWLY THAT OTHER PEOPLE COULD HAVE NOTICED. OR THE OPPOSITE, BEING SO FIGETY OR RESTLESS THAT YOU HAVE BEEN MOVING AROUND A LOT MORE THAN USUAL: NOT AT ALL
SUM OF ALL RESPONSES TO PHQ QUESTIONS 1-9: 3
2. FEELING DOWN, DEPRESSED OR HOPELESS: NOT AT ALL
3. TROUBLE FALLING OR STAYING ASLEEP: SEVERAL DAYS
SUM OF ALL RESPONSES TO PHQ9 QUESTIONS 1 & 2: 1

## 2024-04-05 NOTE — TELEPHONE ENCOUNTER
Received referral from Dr. Cummings for varicose veins, left message for patient to schedule appointment with Dr. Hurd.

## 2024-04-05 NOTE — PROGRESS NOTES
Chief Complaint   Patient presents with    Follow-up     Tsh and chol test needed and needs referral for needs medical clearance for ortho and referral to dr nunez       HPI: This is a patient of Dr. Giraldo here to establish herself in the clinic  This is a pleasant 58-year-old white female (deaf) with past medical history significant for hypothyroidism and hyperlipidemia, mild depression and varicose veins of lower extremities.  She is doing well but she is complaining about varicose veins in the right lower extremity and she needs to refer her to vascular surgery.  She also has a small ganglion cyst on her wrist and she would like to be referred to orthopedic surgery.  She has not had her Pap smear and she is scheduled for it in May.  She claims that she had her mammogram and June 2023 but the results are not available.  She is going to get it through gynecology.  Referral was made.    Social history: , lives with her daughter  Works at [employer]  Used to smoke tobacco but she quit about 4 5 years ago.  Denies any alcohol or drug abuse.    Blood work was reviewed and discussed with patient appears within reasonable range with mild decrease in the TSH but the T4 level is within normal limits    History is taken through an .    Past Medical History, Surgical History, and Family History has been reviewed and updated.    Review of Systems:  Constitutional:  No fever, no fatigue, no chills, no headaches, no weight change  Dermatology:  No rash, no mole, no dry or sensitive skin  ENT:  No cough, no sore throat, no sinus pain, no runny nose, no ear pain  Cardiology:  No chest pain, no palpitations, no leg edema, no shortness of breath, no PND  Gastroenterology:  No dysphagia, no abdominal pain, no nausea, no vomiting, no constipation, no diarrhea, no heartburn  Musculoskeletal:  No joint pain, no leg cramps, no back pain, no muscle aches  Respiratory:  No shortness of breath, no orthopnea, no wheezing, no

## 2024-04-09 ENCOUNTER — TELEPHONE (OUTPATIENT)
Dept: PRIMARY CARE CLINIC | Age: 59
End: 2024-04-09

## 2024-04-09 DIAGNOSIS — I83.813 VARICOSE VEINS OF BOTH LOWER EXTREMITIES WITH PAIN: Primary | ICD-10-CM

## 2024-05-20 ENCOUNTER — OFFICE VISIT (OUTPATIENT)
Dept: ORTHOPEDIC SURGERY | Age: 59
End: 2024-05-20
Payer: COMMERCIAL

## 2024-05-20 VITALS — WEIGHT: 142 LBS | BODY MASS INDEX: 24.24 KG/M2 | HEIGHT: 64 IN

## 2024-05-20 DIAGNOSIS — M67.441 DIGITAL MUCOUS CYST OF FINGER OF RIGHT HAND: Primary | ICD-10-CM

## 2024-05-20 DIAGNOSIS — M67.431 GANGLION CYST OF VOLAR ASPECT OF RIGHT WRIST: ICD-10-CM

## 2024-05-20 PROCEDURE — 99214 OFFICE O/P EST MOD 30 MIN: CPT | Performed by: ORTHOPAEDIC SURGERY

## 2024-05-20 NOTE — PROGRESS NOTES
Chief Complaint   Patient presents with    Hand Pain     Right Hand, pending surgery on 2024       Raysa Andrews is a 58 y.o. year old  female who presents for follow up of right hand pain. She stated there is swelling and a bump on the thumb.  Her pain is located mainly in the thumb. The pain is worse with movement, writing, and better with rest.  She is right hand dominant.  She works at [employer].    Past Medical History:   Diagnosis Date    Thyroid disease      Past Surgical History:   Procedure Laterality Date    CARPAL TUNNEL RELEASE Bilateral     CYST REMOVAL      axilla    FOOT SURGERY      spur    HYSTERECTOMY (CERVIX STATUS UNKNOWN) N/A 2022    ROBOTIC ASSISTED TOTAL LAPAROSCOPIC HYSTERECTOMY, BILATERAL SALPINGO-OOPHORECTOMY (CPT 12526) performed by Sherri Wakefield MD at [facility] OR    TONSILLECTOMY         Current Outpatient Medications:     levothyroxine (SYNTHROID) 88 MCG tablet, Take 1 tablet by mouth Daily, Disp: 90 tablet, Rfl: 0    simvastatin (ZOCOR) 10 MG tablet, TAKE 1 TABLET BY MOUTH EVERY DAY FOR 30 DAYS, Disp: 270 tablet, Rfl: 0    fluticasone (FLONASE) 50 MCG/ACT nasal spray, SPRAY 1 SPRAY BY INTRANASAL ROUTE EVERY DAY, Disp: 16 g, Rfl: 2    liothyronine (CYTOMEL) 5 MCG tablet, TAKE 1 TABLET BY MOUTH TWICE A DAY IN THE MORNING AND IN THE EVENING, Disp: 180 tablet, Rfl: 1    escitalopram (LEXAPRO) 5 MG tablet, Take 1 tablet by mouth daily, Disp: , Rfl:   No Known Allergies  Social History     Socioeconomic History    Marital status:      Spouse name: Not on file    Number of children: Not on file    Years of education: Not on file    Highest education level: Not on file   Occupational History    Not on file   Tobacco Use    Smoking status: Former     Current packs/day: 0.00     Average packs/day: 0.5 packs/day for 33.0 years (16.5 ttl pk-yrs)     Types: Cigarettes     Start date:      Quit date:      Years since quittin.3    Smokeless tobacco: Never   Vaping

## 2024-05-31 ENCOUNTER — TELEPHONE (OUTPATIENT)
Dept: ORTHOPEDIC SURGERY | Age: 59
End: 2024-05-31

## 2024-05-31 ENCOUNTER — PREP FOR PROCEDURE (OUTPATIENT)
Dept: ORTHOPEDIC SURGERY | Age: 59
End: 2024-05-31

## 2024-05-31 DIAGNOSIS — M67.431 GANGLION OF WRIST, RIGHT: ICD-10-CM

## 2024-05-31 DIAGNOSIS — M67.441 GANGLION OF HAND, RIGHT: ICD-10-CM

## 2024-05-31 NOTE — TELEPHONE ENCOUNTER
Prior Authorization Form:      DEMOGRAPHICS:                     Patient Name:  Chandrika Patterson  Patient :  1965            Insurance:  Payor: BCBS / Plan: BCBS OUT OF STATE / Product Type: *No Product type* /   Insurance ID Number:    Payer/Plan Subscr  Sex Relation Sub. Ins. ID Effective Group Num   1. BCBS - BCBS O* CHANDRIKA PATTERSON 1965 Female Self [id number] 20 [id number]                                    [id number]         DIAGNOSIS & PROCEDURE:                       Procedure/Operation: RIGHT THUMB MUCOUS CYST REMOVAL AND RIGHT WRIST GANGLION CYST REMOVAL           CPT Code: 48108; 36327    Diagnosis:  RIGHT THUMB MUCOUS CYST AND RIGHT WRIST GANGLION CYST    ICD10 Code: M67. 441; M67.431    Location:  [facility]    Surgeon:  NADINE ARREOLA    SCHEDULING INFORMATION:                          Date: 24    Time: 6:30AN              Anesthesia:  Juvenal Block                                                       Status:  Outpatient        Special Comments:  NONE       Electronically signed by Shaniqua Coburn ATC on 2024 at 1:46 PM

## 2024-06-07 ENCOUNTER — OFFICE VISIT (OUTPATIENT)
Age: 59
End: 2024-06-07

## 2024-06-07 VITALS
BODY MASS INDEX: 23.52 KG/M2 | HEART RATE: 76 BPM | DIASTOLIC BLOOD PRESSURE: 88 MMHG | WEIGHT: 137 LBS | SYSTOLIC BLOOD PRESSURE: 133 MMHG

## 2024-06-07 DIAGNOSIS — Z12.31 BREAST CANCER SCREENING BY MAMMOGRAM: Primary | ICD-10-CM

## 2024-06-07 SDOH — ECONOMIC STABILITY: FOOD INSECURITY: WITHIN THE PAST 12 MONTHS, THE FOOD YOU BOUGHT JUST DIDN'T LAST AND YOU DIDN'T HAVE MONEY TO GET MORE.: NEVER TRUE

## 2024-06-07 SDOH — ECONOMIC STABILITY: FOOD INSECURITY: WITHIN THE PAST 12 MONTHS, YOU WORRIED THAT YOUR FOOD WOULD RUN OUT BEFORE YOU GOT MONEY TO BUY MORE.: NEVER TRUE

## 2024-06-07 SDOH — ECONOMIC STABILITY: INCOME INSECURITY: HOW HARD IS IT FOR YOU TO PAY FOR THE VERY BASICS LIKE FOOD, HOUSING, MEDICAL CARE, AND HEATING?: NOT HARD AT ALL

## 2024-06-07 ASSESSMENT — ENCOUNTER SYMPTOMS
RECTAL PAIN: 0
DIARRHEA: 0
ABDOMINAL PAIN: 0
BLOOD IN STOOL: 0
ABDOMINAL DISTENTION: 0
NAUSEA: 0
CONSTIPATION: 0
VOMITING: 0

## 2024-06-07 NOTE — PROGRESS NOTES
This report has been created using voice recognition software.  It may contain errors which are inherent in voice recognition technology.    Raysa Andrews     Patient presents for annual exam.  Patient for a vaginal bulge, which has increased over the last couple years.  She had some prolapse prior to hysterectomy, but after the surgery she noted that it has increased.  She does perform heavy lifting at work.  No problems with her bowel movements, however when she voids the stream is in multiple directions and this is very concerning to her.  She does feel that she is emptying her bladder well.  No loss of urine with Valsalva.  No frequency or urgency.  No problems with intercourse.  No back pain.  The prolapse appears intermittently, and is no worse at the beginning of the day versus at the end of the day.  She notes it after heavy lifting, that at other times she may have no prolapse at all through the day.  She had a history of a total laparoscopic hysterectomy and bilateral salpingectomy.    No pelvic pain.  No bloating cramping abdominal pain.    She is interested in surgery for treatment of her symptoms.  The phone number for urogynecology [facility] and [facility] given to her.    Past Medical History:   Diagnosis Date    Thyroid disease         Past Surgical History:   Procedure Laterality Date    CARPAL TUNNEL RELEASE Bilateral     CYST REMOVAL      axilla    FOOT SURGERY      spur    HYSTERECTOMY (CERVIX STATUS UNKNOWN) N/A 08/05/2022    ROBOTIC ASSISTED TOTAL LAPAROSCOPIC HYSTERECTOMY, BILATERAL SALPINGO-OOPHORECTOMY (CPT 40705) performed by Sherri Wakefield MD at [facility] OR    TONSILLECTOMY          Family History   Problem Relation Age of Onset    Diabetes Mother     Coronary Art Dis Father     Cancer Father           Current Outpatient Medications:     levothyroxine (SYNTHROID) 88 MCG tablet, Take 1 tablet by mouth Daily, Disp: 90 tablet, Rfl: 0    simvastatin (ZOCOR) 10 MG tablet,

## 2024-06-10 ENCOUNTER — ANESTHESIA EVENT (OUTPATIENT)
Dept: OPERATING ROOM | Age: 59
End: 2024-06-10
Payer: COMMERCIAL

## 2024-06-14 ENCOUNTER — HOSPITAL ENCOUNTER (OUTPATIENT)
Age: 59
Discharge: HOME | End: 2024-06-14

## 2024-06-14 ENCOUNTER — TELEPHONE (OUTPATIENT)
Age: 59
End: 2024-06-14

## 2024-06-14 NOTE — TELEPHONE ENCOUNTER
Please call urogynecology at [facility] and get patient an appointment.  Diagnosis is cystocele.

## 2024-06-14 NOTE — TELEPHONE ENCOUNTER
Called [facility] urogynecology [phone]. Verbal referral was placed and pt is scheduled . Lvm with patient to call incase times don't work for her.

## 2024-06-14 NOTE — TELEPHONE ENCOUNTER
Patient called in requesting a referral to be placed to [facility] urology ([phone])and oncology to be faxed to [phone].please advise?

## 2024-06-18 LAB
EKG ATRIAL RATE: 72 BPM
EKG ATRIAL RATE: 72 BPM
EKG P AXIS: 57 DEGREES
EKG P AXIS: 57 DEGREES
EKG P-R INTERVAL: 148 MS
EKG P-R INTERVAL: 148 MS
EKG Q-T INTERVAL: 376 MS
EKG Q-T INTERVAL: 376 MS
EKG QRS DURATION: 82 MS
EKG QRS DURATION: 82 MS
EKG QTC CALCULATION (BAZETT): 411 MS
EKG QTC CALCULATION (BAZETT): 411 MS
EKG R AXIS: 10 DEGREES
EKG R AXIS: 10 DEGREES
EKG T AXIS: 59 DEGREES
EKG T AXIS: 59 DEGREES
EKG VENTRICULAR RATE: 72 BPM
EKG VENTRICULAR RATE: 72 BPM

## 2024-06-20 NOTE — ANESTHESIA PRE PROCEDURE
BMI:   Wt Readings from Last 3 Encounters:   06/07/24 62.1 kg (137 lb)   05/20/24 64.4 kg (142 lb)   04/05/24 64.8 kg (142 lb 14.4 oz)     Body mass index is 23.17 kg/m².    CBC:   Lab Results   Component Value Date/Time    WBC 4.5 03/29/2024 08:19 AM    RBC 4.92 03/29/2024 08:19 AM    HGB 14.3 03/29/2024 08:19 AM    HCT 43.7 03/29/2024 08:19 AM    MCV 88.8 03/29/2024 08:19 AM    RDW 11.9 03/29/2024 08:19 AM     03/29/2024 08:19 AM       CMP:   Lab Results   Component Value Date/Time     03/29/2024 08:19 AM    K 4.5 03/29/2024 08:19 AM     03/29/2024 08:19 AM    CO2 23 03/29/2024 08:19 AM    BUN 12 03/29/2024 08:19 AM    CREATININE 0.8 03/29/2024 08:19 AM    LABGLOM >90 03/29/2024 08:19 AM    GLUCOSE 83 03/29/2024 08:19 AM    CALCIUM 9.9 03/29/2024 08:19 AM    BILITOT 0.7 03/29/2024 08:19 AM    ALKPHOS 95 03/29/2024 08:19 AM    AST 55 03/29/2024 08:19 AM    ALT 17 03/29/2024 08:19 AM       POC Tests: No results for input(s): \"POCGLU\", \"POCNA\", \"POCK\", \"POCCL\", \"POCBUN\", \"POCHEMO\", \"POCHCT\" in the last 72 hours.    Coags: No results found for: \"PROTIME\", \"INR\", \"APTT\"    HCG (If Applicable): No results found for: \"PREGTESTUR\", \"PREGSERUM\", \"HCG\", \"HCGQUANT\"     ABGs: No results found for: \"PHART\", \"PO2ART\", \"VPG8ITQ\", \"YGC4LCV\", \"BEART\", \"O0MPBTVS\"     Type & Screen (If Applicable):  No results found for: \"LABABO\"    Drug/Infectious Status (If Applicable):  No results found for: \"HIV\", \"HEPCAB\"    COVID-19 Screening (If Applicable): No results found for: \"COVID19\"        Anesthesia Evaluation  Patient summary reviewed   no history of anesthetic complications:   Airway: Mallampati: II  TM distance: >3 FB   Neck ROM: full  Mouth opening: > = 3 FB   Dental: normal exam     Comment: No loose teeth    Pulmonary: breath sounds clear to auscultation      (-) not a current smoker (ex 49.5 pk yr smoker)

## 2024-06-21 ENCOUNTER — HOSPITAL ENCOUNTER (OUTPATIENT)
Age: 59
Setting detail: OUTPATIENT SURGERY
Discharge: HOME | End: 2024-06-21
Attending: ORTHOPAEDIC SURGERY | Admitting: ORTHOPAEDIC SURGERY
Payer: COMMERCIAL

## 2024-06-21 ENCOUNTER — ANESTHESIA (OUTPATIENT)
Dept: OPERATING ROOM | Age: 59
End: 2024-06-21
Payer: COMMERCIAL

## 2024-06-21 VITALS
DIASTOLIC BLOOD PRESSURE: 67 MMHG | BODY MASS INDEX: 23.22 KG/M2 | OXYGEN SATURATION: 100 % | SYSTOLIC BLOOD PRESSURE: 125 MMHG | WEIGHT: 136 LBS | TEMPERATURE: 97.4 F | RESPIRATION RATE: 16 BRPM | HEIGHT: 64 IN | HEART RATE: 59 BPM

## 2024-06-21 DIAGNOSIS — M67.431 GANGLION OF WRIST, RIGHT: Primary | ICD-10-CM

## 2024-06-21 DIAGNOSIS — M67.441 GANGLION OF HAND, RIGHT: ICD-10-CM

## 2024-06-21 PROCEDURE — 7100000010 HC PHASE II RECOVERY - FIRST 15 MIN: Performed by: ORTHOPAEDIC SURGERY

## 2024-06-21 PROCEDURE — 6370000000 HC RX 637 (ALT 250 FOR IP): Performed by: ANESTHESIOLOGY

## 2024-06-21 PROCEDURE — 2709999900 HC NON-CHARGEABLE SUPPLY: Performed by: ORTHOPAEDIC SURGERY

## 2024-06-21 PROCEDURE — 88304 TISSUE EXAM BY PATHOLOGIST: CPT

## 2024-06-21 PROCEDURE — 2580000003 HC RX 258: Performed by: ANESTHESIOLOGY

## 2024-06-21 PROCEDURE — 2580000003 HC RX 258

## 2024-06-21 PROCEDURE — 26160 REMOVE TENDON SHEATH LESION: CPT | Performed by: ORTHOPAEDIC SURGERY

## 2024-06-21 PROCEDURE — 3600000002 HC SURGERY LEVEL 2 BASE: Performed by: ORTHOPAEDIC SURGERY

## 2024-06-21 PROCEDURE — 3700000001 HC ADD 15 MINUTES (ANESTHESIA): Performed by: ORTHOPAEDIC SURGERY

## 2024-06-21 PROCEDURE — 6360000002 HC RX W HCPCS: Performed by: NURSE ANESTHETIST, CERTIFIED REGISTERED

## 2024-06-21 PROCEDURE — 6360000002 HC RX W HCPCS

## 2024-06-21 PROCEDURE — 3600000012 HC SURGERY LEVEL 2 ADDTL 15MIN: Performed by: ORTHOPAEDIC SURGERY

## 2024-06-21 PROCEDURE — 3700000000 HC ANESTHESIA ATTENDED CARE: Performed by: ORTHOPAEDIC SURGERY

## 2024-06-21 PROCEDURE — 2500000003 HC RX 250 WO HCPCS: Performed by: ANESTHESIOLOGY

## 2024-06-21 PROCEDURE — 7100000011 HC PHASE II RECOVERY - ADDTL 15 MIN: Performed by: ORTHOPAEDIC SURGERY

## 2024-06-21 PROCEDURE — 25111 REMOVE WRIST TENDON LESION: CPT | Performed by: ORTHOPAEDIC SURGERY

## 2024-06-21 RX ORDER — NALOXONE HYDROCHLORIDE 0.4 MG/ML
INJECTION, SOLUTION INTRAMUSCULAR; INTRAVENOUS; SUBCUTANEOUS PRN
Status: DISCONTINUED | OUTPATIENT
Start: 2024-06-21 | End: 2024-06-21 | Stop reason: HOSPADM

## 2024-06-21 RX ORDER — FENTANYL CITRATE 0.05 MG/ML
50 INJECTION, SOLUTION INTRAMUSCULAR; INTRAVENOUS EVERY 5 MIN PRN
Status: DISCONTINUED | OUTPATIENT
Start: 2024-06-21 | End: 2024-06-21 | Stop reason: HOSPADM

## 2024-06-21 RX ORDER — SODIUM CHLORIDE 0.9 % (FLUSH) 0.9 %
5-40 SYRINGE (ML) INJECTION EVERY 12 HOURS SCHEDULED
Status: DISCONTINUED | OUTPATIENT
Start: 2024-06-21 | End: 2024-06-21 | Stop reason: HOSPADM

## 2024-06-21 RX ORDER — HYDROCODONE BITARTRATE AND ACETAMINOPHEN 5; 325 MG/1; MG/1
2 TABLET ORAL EVERY 6 HOURS PRN
Status: DISCONTINUED | OUTPATIENT
Start: 2024-06-21 | End: 2024-06-21

## 2024-06-21 RX ORDER — HYDROCODONE BITARTRATE AND ACETAMINOPHEN 5; 325 MG/1; MG/1
1 TABLET ORAL EVERY 6 HOURS PRN
Status: DISCONTINUED | OUTPATIENT
Start: 2024-06-21 | End: 2024-06-21 | Stop reason: HOSPADM

## 2024-06-21 RX ORDER — SODIUM CHLORIDE, SODIUM LACTATE, POTASSIUM CHLORIDE, CALCIUM CHLORIDE 600; 310; 30; 20 MG/100ML; MG/100ML; MG/100ML; MG/100ML
INJECTION, SOLUTION INTRAVENOUS CONTINUOUS
Status: DISCONTINUED | OUTPATIENT
Start: 2024-06-21 | End: 2024-06-21 | Stop reason: HOSPADM

## 2024-06-21 RX ORDER — DIPHENHYDRAMINE HYDROCHLORIDE 50 MG/ML
12.5 INJECTION INTRAMUSCULAR; INTRAVENOUS
Status: DISCONTINUED | OUTPATIENT
Start: 2024-06-21 | End: 2024-06-21 | Stop reason: HOSPADM

## 2024-06-21 RX ORDER — SODIUM CHLORIDE 9 MG/ML
INJECTION, SOLUTION INTRAVENOUS PRN
Status: DISCONTINUED | OUTPATIENT
Start: 2024-06-21 | End: 2024-06-21 | Stop reason: HOSPADM

## 2024-06-21 RX ORDER — SODIUM CHLORIDE 0.9 % (FLUSH) 0.9 %
5-40 SYRINGE (ML) INJECTION PRN
Status: DISCONTINUED | OUTPATIENT
Start: 2024-06-21 | End: 2024-06-21 | Stop reason: HOSPADM

## 2024-06-21 RX ORDER — FENTANYL CITRATE 50 UG/ML
INJECTION, SOLUTION INTRAMUSCULAR; INTRAVENOUS PRN
Status: DISCONTINUED | OUTPATIENT
Start: 2024-06-21 | End: 2024-06-21 | Stop reason: SDUPTHER

## 2024-06-21 RX ORDER — OXYCODONE HYDROCHLORIDE AND ACETAMINOPHEN 5; 325 MG/1; MG/1
1 TABLET ORAL EVERY 4 HOURS PRN
Status: DISCONTINUED | OUTPATIENT
Start: 2024-06-21 | End: 2024-06-21 | Stop reason: HOSPADM

## 2024-06-21 RX ORDER — MEPERIDINE HYDROCHLORIDE 25 MG/ML
12.5 INJECTION INTRAMUSCULAR; INTRAVENOUS; SUBCUTANEOUS ONCE
Status: DISCONTINUED | OUTPATIENT
Start: 2024-06-21 | End: 2024-06-21 | Stop reason: HOSPADM

## 2024-06-21 RX ORDER — LIDOCAINE HYDROCHLORIDE 5 MG/ML
INJECTION, SOLUTION INFILTRATION; INTRAVENOUS
Status: COMPLETED | OUTPATIENT
Start: 2024-06-21 | End: 2024-06-21

## 2024-06-21 RX ORDER — PROPOFOL 10 MG/ML
INJECTION, EMULSION INTRAVENOUS CONTINUOUS PRN
Status: DISCONTINUED | OUTPATIENT
Start: 2024-06-21 | End: 2024-06-21 | Stop reason: SDUPTHER

## 2024-06-21 RX ORDER — MIDAZOLAM HYDROCHLORIDE 1 MG/ML
INJECTION INTRAMUSCULAR; INTRAVENOUS PRN
Status: DISCONTINUED | OUTPATIENT
Start: 2024-06-21 | End: 2024-06-21 | Stop reason: SDUPTHER

## 2024-06-21 RX ORDER — HYDROCODONE BITARTRATE AND ACETAMINOPHEN 5; 325 MG/1; MG/1
1 TABLET ORAL EVERY 6 HOURS PRN
Qty: 28 TABLET | Refills: 0 | Status: SHIPPED | OUTPATIENT
Start: 2024-06-21 | End: 2024-06-28

## 2024-06-21 RX ORDER — MORPHINE SULFATE 2 MG/ML
1 INJECTION, SOLUTION INTRAMUSCULAR; INTRAVENOUS EVERY 5 MIN PRN
Status: DISCONTINUED | OUTPATIENT
Start: 2024-06-21 | End: 2024-06-21 | Stop reason: HOSPADM

## 2024-06-21 RX ORDER — DROPERIDOL 2.5 MG/ML
0.62 INJECTION, SOLUTION INTRAMUSCULAR; INTRAVENOUS
Status: DISCONTINUED | OUTPATIENT
Start: 2024-06-21 | End: 2024-06-21 | Stop reason: HOSPADM

## 2024-06-21 RX ORDER — ONDANSETRON 2 MG/ML
INJECTION INTRAMUSCULAR; INTRAVENOUS PRN
Status: DISCONTINUED | OUTPATIENT
Start: 2024-06-21 | End: 2024-06-21 | Stop reason: SDUPTHER

## 2024-06-21 RX ADMIN — SODIUM CHLORIDE, POTASSIUM CHLORIDE, SODIUM LACTATE AND CALCIUM CHLORIDE: 600; 310; 30; 20 INJECTION, SOLUTION INTRAVENOUS at 11:24

## 2024-06-21 RX ADMIN — HYDROCODONE BITARTRATE AND ACETAMINOPHEN 1 TABLET: 5; 325 TABLET ORAL at 12:44

## 2024-06-21 RX ADMIN — ONDANSETRON 4 MG: 2 INJECTION INTRAMUSCULAR; INTRAVENOUS at 11:37

## 2024-06-21 RX ADMIN — WATER 2000 MG: 1 INJECTION INTRAMUSCULAR; INTRAVENOUS; SUBCUTANEOUS at 11:35

## 2024-06-21 RX ADMIN — FENTANYL CITRATE 50 MCG: 50 INJECTION, SOLUTION INTRAMUSCULAR; INTRAVENOUS at 11:31

## 2024-06-21 RX ADMIN — LIDOCAINE HYDROCHLORIDE 50 ML: 5 INJECTION, SOLUTION INFILTRATION; INTRAVENOUS at 11:33

## 2024-06-21 RX ADMIN — MIDAZOLAM 2 MG: 1 INJECTION INTRAMUSCULAR; INTRAVENOUS at 11:28

## 2024-06-21 RX ADMIN — FENTANYL CITRATE 50 MCG: 50 INJECTION, SOLUTION INTRAMUSCULAR; INTRAVENOUS at 11:37

## 2024-06-21 RX ADMIN — PROPOFOL 75 MCG/KG/MIN: 10 INJECTION, EMULSION INTRAVENOUS at 11:37

## 2024-06-21 ASSESSMENT — PAIN - FUNCTIONAL ASSESSMENT: PAIN_FUNCTIONAL_ASSESSMENT: 0-10

## 2024-06-21 ASSESSMENT — PAIN DESCRIPTION - LOCATION: LOCATION: HAND

## 2024-06-21 ASSESSMENT — LIFESTYLE VARIABLES: SMOKING_STATUS: 0

## 2024-06-21 ASSESSMENT — PAIN DESCRIPTION - ORIENTATION: ORIENTATION: RIGHT

## 2024-06-21 ASSESSMENT — PAIN SCALES - GENERAL: PAINLEVEL_OUTOF10: 8

## 2024-06-21 NOTE — ANESTHESIA POSTPROCEDURE EVALUATION
Department of Anesthesiology  Postprocedure Note    Patient: Raysa Andrews  MRN: [id number]  YOB: 1965  Date of evaluation: 6/21/2024    Procedure Summary       Date: 06/21/24 Room / Location: [address]    Anesthesia Start: 1128 Anesthesia Stop:     Procedure: RIGHT THUMB MUCOUS CYST REMOVAL AND RIGHT WRIST GANGLION CYST REMOVAL-6/21/24 (Right: Hand) Diagnosis:       Ganglion of hand, right      Ganglion of wrist, right      (Ganglion of hand, right [M67.441])      (Ganglion of wrist, right [M67.431])    Surgeons: Omid Ulrich DO Responsible Provider: Reed Merida MD    Anesthesia Type: MAC, [place] block ASA Status: 3            Anesthesia Type: No value filed.    Iza Phase I: Iza Score: 10    Iaz Phase II: Iza Score: 10    Anesthesia Post Evaluation    Patient location during evaluation: PACU  Patient participation: complete - patient participated  Level of consciousness: awake and alert  Airway patency: patent  Nausea & Vomiting: no nausea and no vomiting  Cardiovascular status: hemodynamically stable  Respiratory status: room air and spontaneous ventilation  Hydration status: stable  Pain management: adequate (required pain meds)    No notable events documented.

## 2024-06-21 NOTE — ANESTHESIA PROCEDURE NOTES
Peripheral Block    Patient location during procedure: OR  Reason for block: primary anesthetic and at surgeon's request  Start time: 6/21/2024 11:33 AM  End time: 6/21/2024 11:39 AM  Staffing  Performed: anesthesiologist, resident/CRNA and other anesthesia staff   Anesthesiologist: Reed Merida MD  Resident/CRNA: Shala Burger APRN - CRNA  Other anesthesia staff: Franco Jain RN  Performed by: Shala Burger APRN - CRNA  Authorized by: Reed Merida MD    Preanesthetic Checklist  Completed: patient identified, IV checked, site marked, risks and benefits discussed, surgical/procedural consents, equipment checked, pre-op evaluation, timeout performed, anesthesia consent given, oxygen available, monitors applied/VS acknowledged, fire risk safety assessment completed and verbalized and blood product R/B/A discussed and consented  Peripheral Block   Patient position: supine  Prep: alcohol swabs  Provider prep: mask  Patient monitoring: cardiac monitor, continuous pulse ox, continuous capnometry, frequent blood pressure checks, IV access, responsive to questions and oxygen  Block type: [place] block  Laterality: right  Injection technique: single-shot  Guidance: other    Assessment   Outcomes: uncomplicated and patient tolerated procedure well    Medications Administered  lidocaine injection 0.5% - IntraVENous, Hand Right   50 mL - 6/21/2024 11:33:00 AM

## 2024-06-21 NOTE — DISCHARGE INSTRUCTIONS
example, call if:    You passed out (lost consciousness).   Call your doctor now or seek immediate medical care if:    You have new or worse nausea or vomiting.     You are too sick to your stomach to drink any fluids.     You cannot keep down fluids.     You have symptoms of dehydration, such as:  Dry eyes and a dry mouth.  Passing only a little urine.  Feeling thirstier than usual.     Your pain medicine is not helping.     You are dizzy or lightheaded, or you feel like you may faint.   Watch closely for changes in your health, and be sure to contact your doctor if:    You do not get better as expected.   Current as of: October 19, 2023  Content Version: 14.1  © 2006-2024 [employer].   Care instructions adapted under license by [employer]. If you have questions about a medical condition or this instruction, always ask your healthcare professional. [employer] disclaims any warranty or liability for your use of this information.           Infection After Surgery: Care Instructions  Overview  After surgery, an infection is always possible. It doesn't mean that the surgery didn't go well.  Because an infection can be serious, your doctor has taken steps to manage it.  Your doctor checked the infection and cleaned it if necessary. Your doctor may have made an opening in the area so that the pus can drain out. You may have gauze in the cut so that the area will stay open and keep draining. You may need antibiotics.  You will need to follow up with your doctor to make sure the infection has gone away.  Follow-up care is a key part of your treatment and safety. Be sure to make and go to all appointments, and call your doctor if you are having problems. It's also a good idea to know your test results and keep a list of the medicines you take.  How can you care for yourself at home?  Make sure your surgeon knows about the infection, especially if you saw another doctor about your symptoms.  If  by [facility]. If you have questions about a medical condition or this instruction, always ask your healthcare professional. [employer] disclaims any warranty or liability for your use of this information.

## 2024-06-21 NOTE — H&P
Updated H&P      Chief Complaint   Patient presents with    Hand Pain       Right Hand, pending surgery on 06/21/2024         Raysa Andrews is a 58 y.o. year old  female who presents for follow up of right hand pain. She stated there is swelling and a bump on the thumb.  Her pain is located mainly in the thumb. The pain is worse with movement, writing, and better with rest.  She is right hand dominant.  She works at [employer].     Past Medical History        Past Medical History:   Diagnosis Date    Thyroid disease           Past Surgical History         Past Surgical History:   Procedure Laterality Date    CARPAL TUNNEL RELEASE Bilateral      CYST REMOVAL         axilla    FOOT SURGERY         spur    HYSTERECTOMY (CERVIX STATUS UNKNOWN) N/A 08/05/2022     ROBOTIC ASSISTED TOTAL LAPAROSCOPIC HYSTERECTOMY, BILATERAL SALPINGO-OOPHORECTOMY (CPT 09263) performed by Sherri Wakefield MD at [facility] OR    TONSILLECTOMY               Current Medication      Current Outpatient Medications:     levothyroxine (SYNTHROID) 88 MCG tablet, Take 1 tablet by mouth Daily, Disp: 90 tablet, Rfl: 0    simvastatin (ZOCOR) 10 MG tablet, TAKE 1 TABLET BY MOUTH EVERY DAY FOR 30 DAYS, Disp: 270 tablet, Rfl: 0    fluticasone (FLONASE) 50 MCG/ACT nasal spray, SPRAY 1 SPRAY BY INTRANASAL ROUTE EVERY DAY, Disp: 16 g, Rfl: 2    liothyronine (CYTOMEL) 5 MCG tablet, TAKE 1 TABLET BY MOUTH TWICE A DAY IN THE MORNING AND IN THE EVENING, Disp: 180 tablet, Rfl: 1    escitalopram (LEXAPRO) 5 MG tablet, Take 1 tablet by mouth daily, Disp: , Rfl:      No Known Allergies  Social History               Socioeconomic History    Marital status:        Spouse name: Not on file    Number of children: Not on file    Years of education: Not on file    Highest education level: Not on file   Occupational History    Not on file   Tobacco Use    Smoking status: Former       Current packs/day: 0.00       Average packs/day: 0.5 packs/day for 33.0 years (16.5 ttl

## 2024-07-02 LAB — SURGICAL PATHOLOGY REPORT: NORMAL

## 2024-07-02 NOTE — OP NOTE
Operative Note      Patient: Raysa Andrews  YOB: 1965  MRN: [id number]    Date of Procedure: 6/21/2024    Pre-Op Diagnosis Codes:     * Ganglion of hand, right [M67.441]     * Ganglion of wrist, right [M67.431]    Post-Op Diagnosis: Same       Procedure(s):  RIGHT THUMB MUCOUS CYST REMOVAL AND RIGHT WRIST GANGLION CYST REMOVAL-6/21/24    Surgeon(s):  Omid Ulrich DO    Assistant:   Resident: Medardo Funk DO    Anesthesia: Juvenal Block    Estimated Blood Loss (mL): less than 100     Complications: None    Specimens:   ID Type Source Tests Collected by Time Destination   A : RIGHT WRIST GANGLION CYST Tissue Tissue SURGICAL PATHOLOGY Omid Ulrich DO 6/21/2024 1159        Implants:  * No implants in log *      Drains: * No LDAs found *    Findings:  Infection Present At Time Of Surgery (PATOS) (choose all levels that have infection present):  No infection present  Other Findings: as above  This procedure was not performed to treat primary cutaneous melanoma through wide local excision    Detailed Description of Procedure:   See dictation    Electronically signed by Omid Ulrich DO on 7/2/2024 at 7:00 AM

## 2024-07-02 NOTE — OP NOTE
[address] [address]                            OPERATIVE REPORT      PATIENT NAME: CHANDRIKA PATTERSON              : 1965  MED REC NO: [id number]                        ROOM: [facility]  ACCOUNT NO: [id number]                       ADMIT DATE: 2024  PROVIDER: Omid Ulrich DO      DATE OF PROCEDURE:  2024    SURGEON:  Omid Ulrich DO    PREOPERATIVE DIAGNOSES:    1. Mucous cyst, right thumb.  2. Volar ganglion cyst.    POSTOPERATIVE DIAGNOSES:    1. Mucous cyst, right thumb.  2. Volar ganglion cyst.    PROCEDURE:    1. Removal of volar ganglion cyst.  2. Removal of mucous cyst of right thumb.    ASSISTANT:  Per chart.    ANESTHESIA:  Juvenal block.    BLOOD LOSS:  Minimal.    COMPLICATIONS:  None.    OPERATIVE PROCEDURE:  The patient was taken to the operative suite, was given a Juvenal block anesthesia.  The right arm was identified with preoperative time-out.  The cyst on the volar radial side of the wrist was then identified and localized.  We made a longitudinal incision directly over the cyst region with a 15 blade.  I then dissected down to the cyst.  Cyst was identified.  It was intertwined within the radial artery.  It was free from the soft tissues or surrounding and the stalk was identified extending down into the radiocarpal joint.  We followed the stalk all way to the joint.  It was incised completely along with a small amount of volar capsule.  The cyst was then sent to Pathology for evaluation.  We then went to the cyst on the thumb, I made an incision with a 15 blade along the radial side of the nail plate extending up to the DIP joint longitudinally.  I then elevated the skin flap and exposed the underlying cyst.  Cyst was removed along with some osteophyte along the radial side of the interphalangeal joint.  We did maintain protection to the extensor tendon along the dorsal surface.  Excess bone was

## 2024-07-08 ENCOUNTER — OFFICE VISIT (OUTPATIENT)
Dept: ORTHOPEDIC SURGERY | Age: 59
End: 2024-07-08

## 2024-07-08 VITALS — HEIGHT: 64 IN | BODY MASS INDEX: 23.22 KG/M2 | TEMPERATURE: 98 F | WEIGHT: 136 LBS

## 2024-07-08 DIAGNOSIS — M67.441 DIGITAL MUCOUS CYST OF FINGER OF RIGHT HAND: ICD-10-CM

## 2024-07-08 DIAGNOSIS — M67.431 GANGLION OF WRIST, RIGHT: Primary | ICD-10-CM

## 2024-07-08 PROCEDURE — 99024 POSTOP FOLLOW-UP VISIT: CPT

## 2024-07-08 NOTE — PROGRESS NOTES
Raysa Andrews is here for followup after right thumb mucous cyst and right wrist ganglion cyst removal surgery. Pain is controlled with current analgesics.  Medication(s) being used: ibuprofen (OTC).. The patient notes improvement in the following symptoms:pain, sensation.  The patient denies fever, wound drainage, increasing redness, pus, increasing pain, increasing swelling. Post op problems reported: none.         Objective:         General :    alert, appears stated age, and cooperative   Sutures:   Sutures out.   Incision:  healing well, no significant drainage, no dehiscence, no significant erythema   Tenderness:  none   Flexion ROM:  diminished range of motion   Extension ROM:  diminished range of motion   Effusion:  mild         Assessment:        Status post right thumb mucous cyst and right wrist ganglion cyst removal surgery. Doing well postoperatively.        Plan:     Sutures removed today.  HEP, ROM of hand, wrist, and fingers  No heavy lifting, pushing, pulling for 3-4 weeks  Can get incision wet, do not submerge until closed  Follow up: 4 weeks. Will remain off of work.

## 2024-07-10 LAB — MAMMOGRAPHY, EXTERNAL: NORMAL

## 2024-07-29 DIAGNOSIS — E03.9 HYPOTHYROIDISM, UNSPECIFIED TYPE: ICD-10-CM

## 2024-07-29 RX ORDER — LEVOTHYROXINE SODIUM 88 UG/1
88 TABLET ORAL DAILY
Qty: 90 TABLET | Refills: 0 | Status: SHIPPED | OUTPATIENT
Start: 2024-07-29

## 2024-08-01 ENCOUNTER — OFFICE VISIT (OUTPATIENT)
Dept: ORTHOPEDIC SURGERY | Age: 59
End: 2024-08-01

## 2024-08-01 VITALS — HEIGHT: 64 IN | BODY MASS INDEX: 23.22 KG/M2 | WEIGHT: 136 LBS

## 2024-08-01 DIAGNOSIS — M67.441 DIGITAL MUCOUS CYST OF FINGER OF RIGHT HAND: ICD-10-CM

## 2024-08-01 DIAGNOSIS — M67.431 GANGLION OF WRIST, RIGHT: Primary | ICD-10-CM

## 2024-08-01 PROCEDURE — 99024 POSTOP FOLLOW-UP VISIT: CPT | Performed by: ORTHOPAEDIC SURGERY

## 2024-08-01 NOTE — PROGRESS NOTES
Raysa Andrews is here for followup after right thumb mucous cyst and right wrist ganglion cyst removal surgery 6/21/24. Pain is controlled with current analgesics.  Medication(s) being used: ibuprofen (OTC).. The patient notes improvement in the following symptoms:pain, sensation.  The patient denies fever, wound drainage, increasing redness, pus, increasing pain, increasing swelling. Post op problems reported: none.         Objective:         General :    alert, appears stated age, and cooperative   Sutures:   Sutures out.   Incision:  healing well, no significant drainage, no dehiscence, no significant erythema   Tenderness:  none   Flexion ROM:  diminished range of motion   Extension ROM:  diminished range of motion   Effusion:  mild         Assessment:        Encounter Diagnoses   Name Primary?    Ganglion of wrist, right Yes    Digital mucous cyst of finger of right hand             Plan:     The patient is still having some pain and would like to go back to work Monday, however, she may need to have intermittent time off for the next month.  She will get the paperwork and we will fill it out.  I will see her back as needed.

## 2024-08-12 ENCOUNTER — TELEPHONE (OUTPATIENT)
Age: 59
End: 2024-08-12

## 2024-08-12 NOTE — TELEPHONE ENCOUNTER
Pt called in with questions regarding mammogram, stated she received a letter stating there were cysts. Reviewed report with pt an no cysts were observed per July 2024 report. Pt verbalized understanding. Pt states she will call back at later time to schedule annual with Dr Marte.

## 2024-09-27 DIAGNOSIS — E03.9 HYPOTHYROIDISM, UNSPECIFIED TYPE: ICD-10-CM

## 2024-10-01 RX ORDER — LIOTHYRONINE SODIUM 5 UG/1
TABLET ORAL
Qty: 180 TABLET | Refills: 1 | Status: SHIPPED | OUTPATIENT
Start: 2024-10-01

## 2024-10-11 DIAGNOSIS — M79.641 RIGHT HAND PAIN: Primary | ICD-10-CM

## 2024-10-14 ENCOUNTER — OFFICE VISIT (OUTPATIENT)
Dept: ORTHOPEDIC SURGERY | Age: 59
End: 2024-10-14
Payer: COMMERCIAL

## 2024-10-14 VITALS — BODY MASS INDEX: 23.22 KG/M2 | HEIGHT: 64 IN | WEIGHT: 136 LBS

## 2024-10-14 DIAGNOSIS — M18.12 DEGENERATIVE ARTHRITIS OF THUMB, LEFT: ICD-10-CM

## 2024-10-14 DIAGNOSIS — E78.5 HYPERLIPIDEMIA, UNSPECIFIED HYPERLIPIDEMIA TYPE: ICD-10-CM

## 2024-10-14 DIAGNOSIS — E03.9 HYPOTHYROIDISM, UNSPECIFIED TYPE: ICD-10-CM

## 2024-10-14 DIAGNOSIS — M67.441 DIGITAL MUCOUS CYST OF FINGER OF RIGHT HAND: Primary | ICD-10-CM

## 2024-10-14 DIAGNOSIS — M67.431 GANGLION OF WRIST, RIGHT: ICD-10-CM

## 2024-10-14 LAB
ALBUMIN: 4.8 G/DL (ref 3.5–5.2)
ALP BLD-CCNC: 101 U/L (ref 35–104)
ALT SERPL-CCNC: 15 U/L (ref 0–32)
ANION GAP SERPL CALCULATED.3IONS-SCNC: 14 MMOL/L (ref 7–16)
AST SERPL-CCNC: 56 U/L (ref 0–31)
BASOPHILS ABSOLUTE: 0.07 K/UL (ref 0–0.2)
BASOPHILS RELATIVE PERCENT: 1 % (ref 0–2)
BILIRUB SERPL-MCNC: 0.7 MG/DL (ref 0–1.2)
BUN BLDV-MCNC: 13 MG/DL (ref 6–20)
CALCIUM SERPL-MCNC: 10.2 MG/DL (ref 8.6–10.2)
CHLORIDE BLD-SCNC: 104 MMOL/L (ref 98–107)
CHOLESTEROL, TOTAL: 203 MG/DL
CO2: 25 MMOL/L (ref 22–29)
CREAT SERPL-MCNC: 0.8 MG/DL (ref 0.5–1)
EOSINOPHILS ABSOLUTE: 0.34 K/UL (ref 0.05–0.5)
EOSINOPHILS RELATIVE PERCENT: 6 % (ref 0–6)
GFR, ESTIMATED: >90 ML/MIN/1.73M2
GLUCOSE BLD-MCNC: 99 MG/DL (ref 74–99)
HCT VFR BLD CALC: 46.2 % (ref 34–48)
HDLC SERPL-MCNC: 75 MG/DL
HEMOGLOBIN: 15.1 G/DL (ref 11.5–15.5)
IMMATURE GRANULOCYTES %: 0 % (ref 0–5)
IMMATURE GRANULOCYTES ABSOLUTE: <0.03 K/UL (ref 0–0.58)
LDL CHOLESTEROL: 108 MG/DL
LYMPHOCYTES ABSOLUTE: 2.03 K/UL (ref 1.5–4)
LYMPHOCYTES RELATIVE PERCENT: 34 % (ref 20–42)
MCH RBC QN AUTO: 29 PG (ref 26–35)
MCHC RBC AUTO-ENTMCNC: 32.7 G/DL (ref 32–34.5)
MCV RBC AUTO: 88.7 FL (ref 80–99.9)
MONOCYTES ABSOLUTE: 0.51 K/UL (ref 0.1–0.95)
MONOCYTES RELATIVE PERCENT: 9 % (ref 2–12)
NEUTROPHILS ABSOLUTE: 3.05 K/UL (ref 1.8–7.3)
NEUTROPHILS RELATIVE PERCENT: 51 % (ref 43–80)
PDW BLD-RTO: 11.9 % (ref 11.5–15)
PLATELET # BLD: 230 K/UL (ref 130–450)
PMV BLD AUTO: 11.8 FL (ref 7–12)
POTASSIUM SERPL-SCNC: 5.1 MMOL/L (ref 3.5–5)
RBC # BLD: 5.21 M/UL (ref 3.5–5.5)
SODIUM BLD-SCNC: 143 MMOL/L (ref 132–146)
TOTAL PROTEIN: 7.3 G/DL (ref 6.4–8.3)
TRIGL SERPL-MCNC: 98 MG/DL
TSH SERPL DL<=0.05 MIU/L-ACNC: 0.05 UIU/ML (ref 0.27–4.2)
VLDLC SERPL CALC-MCNC: 20 MG/DL
WBC # BLD: 6 K/UL (ref 4.5–11.5)

## 2024-10-14 PROCEDURE — 99213 OFFICE O/P EST LOW 20 MIN: CPT | Performed by: ORTHOPAEDIC SURGERY

## 2024-10-14 RX ORDER — METHYLPREDNISOLONE 4 MG
TABLET, DOSE PACK ORAL
Qty: 1 KIT | Refills: 0 | Status: SHIPPED | OUTPATIENT
Start: 2024-10-14 | End: 2024-10-20

## 2024-10-14 RX ORDER — CELECOXIB 200 MG/1
200 CAPSULE ORAL DAILY
Qty: 30 CAPSULE | Refills: 3 | Status: SHIPPED | OUTPATIENT
Start: 2024-10-14 | End: 2025-02-11

## 2024-10-14 NOTE — PROGRESS NOTES
Chief Complaint   Patient presents with    Hand Pain     Follow up right thumb. DOS 06/21/24. Patient having pain around the joint area. Difficulty writing and has a funny feeling. Patient states she is still having swelling and pain off an on and pain to touch.    Wrist Pain     Follow up right wrist. DOS 06/21/14. Wrist is good. No pain. No issues since last visit.         Raysa Andrews is a 59 y.o. year old  who presents for follow up of right hand and wrist pain.  She states she has trouble writing.    Past Medical History:   Diagnosis Date    Deaf     Hyperlipidemia     Thyroid disease      Past Surgical History:   Procedure Laterality Date    CARPAL TUNNEL RELEASE Bilateral     COLONOSCOPY      CYST REMOVAL      axilla    FOOT SURGERY      spur    HAND SURGERY Right 6/21/2024    RIGHT THUMB MUCOUS CYST REMOVAL AND RIGHT WRIST GANGLION CYST REMOVAL-6/21/24 performed by Omid Ulrich DO at [facility] OR    HYSTERECTOMY (CERVIX STATUS UNKNOWN) N/A 08/05/2022    ROBOTIC ASSISTED TOTAL LAPAROSCOPIC HYSTERECTOMY, BILATERAL SALPINGO-OOPHORECTOMY (CPT 49722) performed by Sherri Wakefield MD at [facility] OR    TONSILLECTOMY         Current Outpatient Medications:     methylPREDNISolone (MEDROL, BRITTANIE,) 4 MG tablet, Take by mouth., Disp: 1 kit, Rfl: 0    celecoxib (CELEBREX) 200 MG capsule, Take 1 capsule by mouth daily, Disp: 30 capsule, Rfl: 3    liothyronine (CYTOMEL) 5 MCG tablet, TAKE 1 TABLET BY MOUTH TWICE A DAY IN THE MORNING AND IN THE EVENING, Disp: 180 tablet, Rfl: 1    levothyroxine (SYNTHROID) 88 MCG tablet, TAKE 1 TABLET BY MOUTH EVERY DAY, Disp: 90 tablet, Rfl: 0    simvastatin (ZOCOR) 10 MG tablet, TAKE 1 TABLET BY MOUTH EVERY DAY FOR 30 DAYS (Patient taking differently: Take 1 tablet by mouth nightly), Disp: 270 tablet, Rfl: 0    fluticasone (FLONASE) 50 MCG/ACT nasal spray, SPRAY 1 SPRAY BY INTRANASAL ROUTE EVERY DAY, Disp: 16 g, Rfl: 2    escitalopram (LEXAPRO) 5 MG tablet, Take 1 tablet by mouth

## 2024-10-18 ENCOUNTER — OFFICE VISIT (OUTPATIENT)
Dept: PRIMARY CARE CLINIC | Age: 59
End: 2024-10-18

## 2024-10-18 VITALS
BODY MASS INDEX: 23.93 KG/M2 | HEART RATE: 68 BPM | SYSTOLIC BLOOD PRESSURE: 118 MMHG | HEIGHT: 64 IN | OXYGEN SATURATION: 96 % | WEIGHT: 140.2 LBS | DIASTOLIC BLOOD PRESSURE: 72 MMHG | TEMPERATURE: 97.2 F

## 2024-10-18 DIAGNOSIS — E03.9 HYPOTHYROIDISM, UNSPECIFIED TYPE: ICD-10-CM

## 2024-10-18 DIAGNOSIS — Z87.891 PERSONAL HISTORY OF TOBACCO USE: ICD-10-CM

## 2024-10-18 DIAGNOSIS — F33.1 MODERATE EPISODE OF RECURRENT MAJOR DEPRESSIVE DISORDER (HCC): ICD-10-CM

## 2024-10-18 DIAGNOSIS — G56.01 CARPAL TUNNEL SYNDROME OF RIGHT WRIST: ICD-10-CM

## 2024-10-18 DIAGNOSIS — E78.2 MIXED HYPERLIPIDEMIA: Primary | ICD-10-CM

## 2024-10-18 RX ORDER — LEVOTHYROXINE SODIUM 100 UG/1
100 TABLET ORAL DAILY
Qty: 90 TABLET | Refills: 1 | Status: SHIPPED | OUTPATIENT
Start: 2024-10-18

## 2024-10-18 NOTE — PROGRESS NOTES
go to all appointments, and call your doctor if you are having problems. It's also a good idea to know your test results and keep a list of the medicines you take.  Where can you learn more?  Go to https://www.[employer].net/patientEd and enter Q940 to learn more about \"Learning About Lung Cancer Screening.\"  Current as of: October 25, 2023  Content Version: 14.2  © 2024 [employer].   Care instructions adapted under license by [employer]. If you have questions about a medical condition or this instruction, always ask your healthcare professional. [employer] disclaims any warranty or liability for your use of this information.                  Denise Cummings MD   10/18/24 Discussed with the patient the current USPSTF guidelines released March 9, 2021 for screening for lung cancer.    For adults aged 50 to 80 years who have a 20 pack-year smoking history and currently smoke or have quit within the past 15 years the grade B recommendation is to:  Screen for lung cancer with low-dose computed tomography (LDCT) every year.  Stop screening once a person has not smoked for 15 years or has a health problem that limits life expectancy or the ability to have lung surgery.    The patient  reports that she quit smoking about 12 years ago. Her smoking use included cigarettes. She started smoking about 45 years ago. She has a 49.5 pack-year smoking history. She has never used smokeless tobacco.. Discussed with patient the risks and benefits of screening, including over-diagnosis, false positive rate, and total radiation exposure.  The patient currently exhibits no signs or symptoms suggestive of lung cancer.  Discussed with patient the importance of compliance with yearly annual lung cancer screenings and willingness to undergo diagnosis and treatment if screening scan is positive.  In addition, the patient was counseled regarding the importance of remaining smoke free and/or total smoking

## 2024-10-24 DIAGNOSIS — E03.9 HYPOTHYROIDISM, UNSPECIFIED TYPE: ICD-10-CM

## 2024-10-24 RX ORDER — LEVOTHYROXINE SODIUM 88 UG/1
88 TABLET ORAL DAILY
Qty: 90 TABLET | Refills: 0 | OUTPATIENT
Start: 2024-10-24

## 2024-11-11 ENCOUNTER — OFFICE VISIT (OUTPATIENT)
Dept: ORTHOPEDIC SURGERY | Age: 59
End: 2024-11-11

## 2024-11-11 VITALS
BODY MASS INDEX: 23.9 KG/M2 | OXYGEN SATURATION: 98 % | DIASTOLIC BLOOD PRESSURE: 77 MMHG | WEIGHT: 140 LBS | HEIGHT: 64 IN | TEMPERATURE: 98.1 F | SYSTOLIC BLOOD PRESSURE: 134 MMHG | HEART RATE: 77 BPM

## 2024-11-11 DIAGNOSIS — M67.441 DIGITAL MUCOUS CYST OF FINGER OF RIGHT HAND: Primary | ICD-10-CM

## 2024-11-11 NOTE — PROGRESS NOTES
Diagnosis   Name Primary?    Digital mucous cyst of finger of right hand Yes         Plan: Natural history and expected course discussed. Questions answered.  Educational materials distributed.  Rest, ice, compression, and elevation (RICE) therapy.  Discussed with patient that sensitivity can take some time to improve, but in regards to her pain due to arthritis there is not much else I can do. The swelling she has is due to her arthritis.    Can continue to take medication as needed  Follow up 3 months if not bettert

## 2024-11-14 ENCOUNTER — TELEPHONE (OUTPATIENT)
Dept: PRIMARY CARE CLINIC | Age: 59
End: 2024-11-14

## 2024-11-14 NOTE — TELEPHONE ENCOUNTER
Patient would like a referral for a gastroenterologist for a colonoscopy. She would like to go to [facility] in [place]  Fax [phone]

## 2024-11-19 DIAGNOSIS — Z12.11 COLON CANCER SCREENING: Primary | ICD-10-CM

## 2024-11-19 NOTE — TELEPHONE ENCOUNTER
Left a message thru sign  that the colonoscopy referral was faxed to [facility] in [place] and to call with any questions.

## 2024-11-29 ENCOUNTER — HOSPITAL ENCOUNTER (OUTPATIENT)
Dept: CT IMAGING | Age: 59
Discharge: HOME | End: 2024-11-29
Attending: INTERNAL MEDICINE
Payer: COMMERCIAL

## 2024-11-29 DIAGNOSIS — Z87.891 PERSONAL HISTORY OF TOBACCO USE: ICD-10-CM

## 2024-11-29 PROCEDURE — 71271 CT THORAX LUNG CANCER SCR C-: CPT

## 2024-12-16 ENCOUNTER — TELEPHONE (OUTPATIENT)
Dept: PRIMARY CARE CLINIC | Age: 59
End: 2024-12-16

## 2024-12-16 NOTE — TELEPHONE ENCOUNTER
Patient states she was at urgent care a couple weeks ago and was diagnosed with bronchitis- still has dryness in chest and was questioning whether she should have a chest xray for pneumonia    Advised patient the doctors are out of the office until the new year and we recommend she go to an urgent care or walk in clinic    Patient states understanding

## 2025-02-20 DIAGNOSIS — E78.5 HYPERLIPIDEMIA, UNSPECIFIED HYPERLIPIDEMIA TYPE: ICD-10-CM

## 2025-02-20 RX ORDER — SIMVASTATIN 10 MG
10 TABLET ORAL NIGHTLY
Qty: 90 TABLET | Refills: 0 | Status: SHIPPED | OUTPATIENT
Start: 2025-02-20

## 2025-02-20 NOTE — TELEPHONE ENCOUNTER
Name of Medication(s) Requested:  Requested Prescriptions     Pending Prescriptions Disp Refills    simvastatin (ZOCOR) 10 MG tablet 90 tablet 0     Sig: Take 1 tablet by mouth nightly       Medication is on current medication list Yes    Dosage and directions were verified? Yes    Quantity verified: 90 day supply     Pharmacy Verified?  Yes    Last Appointment:  10/18/2024    Future appts:  Future Appointments   Date Time Provider Department Center   3/7/2025 12:00 PM Denise Cummings MD [facility]   6/16/2025  8:00 AM Glenn Marte MD [facility]        (If no appt send self scheduling link. .REFILLAPPT)  Scheduling request sent?     [] Yes  [x] No    Does patient need updated?  [] Yes  [x] No

## 2025-02-24 DIAGNOSIS — E78.2 MIXED HYPERLIPIDEMIA: ICD-10-CM

## 2025-02-24 DIAGNOSIS — E03.9 HYPOTHYROIDISM, UNSPECIFIED TYPE: ICD-10-CM

## 2025-02-24 LAB
ALBUMIN: 4.6 G/DL (ref 3.5–5.2)
ALP BLD-CCNC: 100 U/L (ref 35–104)
ALT SERPL-CCNC: 13 U/L (ref 0–32)
ANION GAP SERPL CALCULATED.3IONS-SCNC: 18 MMOL/L (ref 7–16)
AST SERPL-CCNC: 51 U/L (ref 0–31)
BILIRUB SERPL-MCNC: 0.6 MG/DL (ref 0–1.2)
BUN BLDV-MCNC: 14 MG/DL (ref 6–20)
CALCIUM SERPL-MCNC: 10 MG/DL (ref 8.6–10.2)
CHLORIDE BLD-SCNC: 104 MMOL/L (ref 98–107)
CHOLESTEROL, TOTAL: 185 MG/DL
CO2: 22 MMOL/L (ref 22–29)
CREAT SERPL-MCNC: 0.8 MG/DL (ref 0.5–1)
GFR, ESTIMATED: 81 ML/MIN/1.73M2
GLUCOSE BLD-MCNC: 95 MG/DL (ref 74–99)
HDLC SERPL-MCNC: 77 MG/DL
LDL CHOLESTEROL: 96 MG/DL
POTASSIUM SERPL-SCNC: 4.8 MMOL/L (ref 3.5–5)
SODIUM BLD-SCNC: 144 MMOL/L (ref 132–146)
T3 FREE: 4.03 PG/ML (ref 2–4.4)
T4 FREE: 1.4 NG/DL (ref 0.9–1.7)
TOTAL PROTEIN: 7.1 G/DL (ref 6.4–8.3)
TRIGL SERPL-MCNC: 58 MG/DL
TSH SERPL DL<=0.05 MIU/L-ACNC: 0.18 UIU/ML (ref 0.27–4.2)
VLDLC SERPL CALC-MCNC: 12 MG/DL

## 2025-02-25 DIAGNOSIS — E03.9 HYPOTHYROIDISM, UNSPECIFIED TYPE: ICD-10-CM

## 2025-02-25 RX ORDER — LEVOTHYROXINE SODIUM 75 UG/1
75 TABLET ORAL DAILY
Qty: 90 TABLET | Refills: 0 | Status: SHIPPED
Start: 2025-02-25 | End: 2025-02-26 | Stop reason: SDUPTHER

## 2025-02-25 NOTE — RESULT ENCOUNTER NOTE
Patient contacted at home and voicemail left per interpretor to decrease her thyroid dosage according to recent TSH Results and a new prescription was called to her pharmacy.

## 2025-02-26 ENCOUNTER — TELEPHONE (OUTPATIENT)
Dept: PRIMARY CARE CLINIC | Age: 60
End: 2025-02-26

## 2025-02-26 DIAGNOSIS — E03.9 HYPOTHYROIDISM, UNSPECIFIED TYPE: ICD-10-CM

## 2025-02-26 RX ORDER — LEVOTHYROXINE SODIUM 75 UG/1
75 TABLET ORAL DAILY
Qty: 90 TABLET | Refills: 0 | Status: SHIPPED | OUTPATIENT
Start: 2025-02-26

## 2025-02-26 RX ORDER — LEVOTHYROXINE SODIUM 75 UG/1
75 TABLET ORAL DAILY
Qty: 90 TABLET | Refills: 0 | OUTPATIENT
Start: 2025-02-26

## 2025-02-26 NOTE — TELEPHONE ENCOUNTER
Patient would like the script for Levothyroxine to go to [facility]'s pharmacy    [address], [place], [place]  [phone]

## 2025-03-02 DIAGNOSIS — E03.9 HYPOTHYROIDISM, UNSPECIFIED TYPE: ICD-10-CM

## 2025-03-02 RX ORDER — LEVOTHYROXINE SODIUM 75 UG/1
75 TABLET ORAL DAILY
Qty: 90 TABLET | Refills: 0 | Status: CANCELLED | OUTPATIENT
Start: 2025-03-02

## 2025-03-07 ENCOUNTER — OFFICE VISIT (OUTPATIENT)
Dept: PRIMARY CARE CLINIC | Age: 60
End: 2025-03-07

## 2025-03-07 VITALS
WEIGHT: 143.6 LBS | TEMPERATURE: 98.1 F | HEIGHT: 64 IN | SYSTOLIC BLOOD PRESSURE: 118 MMHG | BODY MASS INDEX: 24.52 KG/M2 | DIASTOLIC BLOOD PRESSURE: 78 MMHG | HEART RATE: 80 BPM | OXYGEN SATURATION: 92 %

## 2025-03-07 DIAGNOSIS — Z01.818 PREOPERATIVE TESTING: Primary | ICD-10-CM

## 2025-03-07 DIAGNOSIS — E78.2 MIXED HYPERLIPIDEMIA: ICD-10-CM

## 2025-03-07 DIAGNOSIS — F33.1 MODERATE EPISODE OF RECURRENT MAJOR DEPRESSIVE DISORDER (HCC): ICD-10-CM

## 2025-03-07 DIAGNOSIS — E03.9 HYPOTHYROIDISM, UNSPECIFIED TYPE: ICD-10-CM

## 2025-03-07 RX ORDER — LIOTHYRONINE SODIUM 5 UG/1
TABLET ORAL
Qty: 180 TABLET | Refills: 1 | Status: CANCELLED | OUTPATIENT
Start: 2025-03-07

## 2025-03-07 RX ORDER — LEVOTHYROXINE SODIUM 75 UG/1
TABLET ORAL
Qty: 90 TABLET | Refills: 0
Start: 2025-03-07

## 2025-03-07 NOTE — PROGRESS NOTES
Lead  -     Comprehensive Metabolic Panel; Future  -     Lipid Panel; Future    Hypothyroidism, unspecified type  -     T4, Free; Future  -     TSH; Future  -     levothyroxine (SYNTHROID) 75 MCG tablet; 1 tab daily except Sunday no medication    Mixed hyperlipidemia    Moderate episode of recurrent major depressive disorder (HCC)    EKG showed normal sinus rhythm with a heart rate of 74 bpm.  Patient is medically cleared for surgery    There are no Patient Instructions on file for this visit.           Denise Cummings MD   3/7/25

## 2025-03-26 ENCOUNTER — PATIENT MESSAGE (OUTPATIENT)
Dept: PRIMARY CARE CLINIC | Age: 60
End: 2025-03-26

## 2025-05-06 ENCOUNTER — OFFICE VISIT (OUTPATIENT)
Dept: ORTHOPEDIC SURGERY | Age: 60
End: 2025-05-06
Payer: COMMERCIAL

## 2025-05-06 VITALS — TEMPERATURE: 98.6 F | WEIGHT: 135 LBS | HEIGHT: 64 IN | BODY MASS INDEX: 23.05 KG/M2

## 2025-05-06 DIAGNOSIS — G56.23 CUBITAL TUNNEL SYNDROME OF BOTH UPPER EXTREMITIES: Primary | ICD-10-CM

## 2025-05-06 PROCEDURE — 99213 OFFICE O/P EST LOW 20 MIN: CPT | Performed by: ORTHOPAEDIC SURGERY

## 2025-05-06 NOTE — PROGRESS NOTES
Chief Complaint   Patient presents with    Follow-up     Bilateral hand numbness.Hx of carpal tunnel sx.  Patient states numbness began 2 weeks ago. Left hand worse than right. Numbness is intermittent and mostly at night when in bed. Denies any pain. States left had ring finger and pinky also have tingling.  Interpretor uses [employer] #[id number].          Raysa Andrews is a 59 y.o. year old  who presents for follow up of b/l hand pain L>R. She stated a few weeks ago she developed numbness and tingling. The ring and pinky finger are numb that is worse when she is sleeping. She had CTR done about 2-3 years ago.    Past Medical History:   Diagnosis Date    Deaf     Hyperlipidemia     Thyroid disease      Past Surgical History:   Procedure Laterality Date    CARPAL TUNNEL RELEASE Bilateral     COLONOSCOPY      CYST REMOVAL      axilla    FOOT SURGERY      spur    HAND SURGERY Right 6/21/2024    RIGHT THUMB MUCOUS CYST REMOVAL AND RIGHT WRIST GANGLION CYST REMOVAL-6/21/24 performed by Omid Ulrich DO at [facility] OR    HYSTERECTOMY (CERVIX STATUS UNKNOWN) N/A 08/05/2022    ROBOTIC ASSISTED TOTAL LAPAROSCOPIC HYSTERECTOMY, BILATERAL SALPINGO-OOPHORECTOMY (CPT 80310) performed by Sherri Wakefield MD at [facility] OR    TONSILLECTOMY         Current Outpatient Medications:     levothyroxine (SYNTHROID) 75 MCG tablet, 1 tab daily except Sunday no medication, Disp: 90 tablet, Rfl: 0    simvastatin (ZOCOR) 10 MG tablet, Take 1 tablet by mouth nightly, Disp: 90 tablet, Rfl: 0    liothyronine (CYTOMEL) 5 MCG tablet, TAKE 1 TABLET BY MOUTH TWICE A DAY IN THE MORNING AND IN THE EVENING (Patient taking differently: Take 1 tablet by mouth daily TAKE 1 TABLET BY MOUTH TWICE A DAY IN THE MORNING AND IN THE EVENING), Disp: 180 tablet, Rfl: 1    fluticasone (FLONASE) 50 MCG/ACT nasal spray, SPRAY 1 SPRAY BY INTRANASAL ROUTE EVERY DAY, Disp: 16 g, Rfl: 2  No Known Allergies  Social History     Socioeconomic History    Marital status:

## 2025-05-17 ENCOUNTER — PATIENT MESSAGE (OUTPATIENT)
Dept: PRIMARY CARE CLINIC | Age: 60
End: 2025-05-17

## 2025-05-19 DIAGNOSIS — E78.5 HYPERLIPIDEMIA, UNSPECIFIED HYPERLIPIDEMIA TYPE: ICD-10-CM

## 2025-05-20 DIAGNOSIS — E03.9 HYPOTHYROIDISM, UNSPECIFIED TYPE: ICD-10-CM

## 2025-05-20 RX ORDER — SIMVASTATIN 10 MG
10 TABLET ORAL NIGHTLY
Qty: 90 TABLET | Refills: 0 | Status: SHIPPED | OUTPATIENT
Start: 2025-05-20

## 2025-05-20 RX ORDER — LEVOTHYROXINE SODIUM 50 UG/1
50 TABLET ORAL DAILY
Qty: 90 TABLET | Refills: 0 | Status: SHIPPED | OUTPATIENT
Start: 2025-05-20

## 2025-05-20 NOTE — TELEPHONE ENCOUNTER
Name of Medication(s) Requested:  Requested Prescriptions     Pending Prescriptions Disp Refills    simvastatin (ZOCOR) 10 MG tablet [Pharmacy Med Name: SIMVASTATIN 10 MG TABLET] 90 tablet 0     Sig: TAKE 1 TABLET BY MOUTH EVERY DAY AT NIGHT       Medication is on current medication list Yes    Dosage and directions were verified? Yes    Quantity verified: 90 day supply     Pharmacy Verified?  Yes    Last Appointment:  3/7/2025    Future appts:  Future Appointments   Date Time Provider Department Center   6/9/2025  3:50 PM [facility]   7/8/2025  8:30 AM Glenn Marte MD [facility]   7/9/2025  9:45 AM Denise Cummings MD [facility]        (If no appt send self scheduling link. .REFILLAPPT)  Scheduling request sent?     [] Yes  [x] No    Does patient need updated?  [] Yes  [x] No

## 2025-06-09 ENCOUNTER — OFFICE VISIT (OUTPATIENT)
Dept: ORTHOPEDIC SURGERY | Age: 60
End: 2025-06-09
Payer: COMMERCIAL

## 2025-06-09 VITALS — BODY MASS INDEX: 23.05 KG/M2 | WEIGHT: 135 LBS | TEMPERATURE: 98.6 F | HEIGHT: 64 IN

## 2025-06-09 DIAGNOSIS — G56.22 CUBITAL TUNNEL SYNDROME ON LEFT: Primary | ICD-10-CM

## 2025-06-09 PROCEDURE — 99213 OFFICE O/P EST LOW 20 MIN: CPT | Performed by: ORTHOPAEDIC SURGERY

## 2025-06-09 NOTE — PROGRESS NOTES
patient      Impression:   Encounter Diagnosis   Name Primary?    Cubital tunnel syndrome on left Yes         Plan: Natural history and expected course discussed. Questions answered.  Educational materials distributed.  Rest, ice, compression, and elevation (RICE) therapy.    [facility] TAMI

## 2025-06-10 DIAGNOSIS — E03.9 HYPOTHYROIDISM, UNSPECIFIED TYPE: ICD-10-CM

## 2025-06-10 RX ORDER — LIOTHYRONINE SODIUM 5 UG/1
5 TABLET ORAL DAILY
Qty: 90 TABLET | Refills: 1 | Status: SHIPPED | OUTPATIENT
Start: 2025-06-10

## 2025-06-10 NOTE — TELEPHONE ENCOUNTER
Name of Medication(s) Requested:  Requested Prescriptions     Pending Prescriptions Disp Refills    liothyronine (CYTOMEL) 5 MCG tablet [Pharmacy Med Name: LIOTHYRONINE SOD 5 MCG TAB] 180 tablet 1     Sig: TAKE 1 TABLET BY MOUTH TWICE A DAY IN THE MORNING AND IN THE EVENING       Medication is on current medication list Yes    Dosage and directions were verified? Yes    Quantity verified: 90 day supply     Pharmacy Verified?  Yes    Last Appointment:  3/7/2025    Future appts:  Future Appointments   Date Time Provider Department Center   7/8/2025  8:30 AM Glenn Marte MD [facility]   7/9/2025  9:45 AM Denise Cummings MD [facility]        (If no appt send self scheduling link. .REFILLAPPT)  Scheduling request sent?     [] Yes  [] No    Does patient need updated?  [] Yes  [] No

## 2025-07-06 SDOH — ECONOMIC STABILITY: TRANSPORTATION INSECURITY
IN THE PAST 12 MONTHS, HAS THE LACK OF TRANSPORTATION KEPT YOU FROM MEDICAL APPOINTMENTS OR FROM GETTING MEDICATIONS?: NO

## 2025-07-06 SDOH — ECONOMIC STABILITY: TRANSPORTATION INSECURITY
IN THE PAST 12 MONTHS, HAS LACK OF TRANSPORTATION KEPT YOU FROM MEETINGS, WORK, OR FROM GETTING THINGS NEEDED FOR DAILY LIVING?: YES

## 2025-07-06 SDOH — ECONOMIC STABILITY: FOOD INSECURITY: WITHIN THE PAST 12 MONTHS, YOU WORRIED THAT YOUR FOOD WOULD RUN OUT BEFORE YOU GOT MONEY TO BUY MORE.: NEVER TRUE

## 2025-07-06 SDOH — ECONOMIC STABILITY: FOOD INSECURITY: WITHIN THE PAST 12 MONTHS, THE FOOD YOU BOUGHT JUST DIDN'T LAST AND YOU DIDN'T HAVE MONEY TO GET MORE.: NEVER TRUE

## 2025-07-06 SDOH — ECONOMIC STABILITY: INCOME INSECURITY: IN THE LAST 12 MONTHS, WAS THERE A TIME WHEN YOU WERE NOT ABLE TO PAY THE MORTGAGE OR RENT ON TIME?: NO

## 2025-07-06 ASSESSMENT — PATIENT HEALTH QUESTIONNAIRE - PHQ9
8. MOVING OR SPEAKING SO SLOWLY THAT OTHER PEOPLE COULD HAVE NOTICED. OR THE OPPOSITE, BEING SO FIGETY OR RESTLESS THAT YOU HAVE BEEN MOVING AROUND A LOT MORE THAN USUAL: NOT AT ALL
7. TROUBLE CONCENTRATING ON THINGS, SUCH AS READING THE NEWSPAPER OR WATCHING TELEVISION: SEVERAL DAYS
6. FEELING BAD ABOUT YOURSELF - OR THAT YOU ARE A FAILURE OR HAVE LET YOURSELF OR YOUR FAMILY DOWN: NOT AT ALL
2. FEELING DOWN, DEPRESSED OR HOPELESS: NOT AT ALL
SUM OF ALL RESPONSES TO PHQ QUESTIONS 1-9: 4
4. FEELING TIRED OR HAVING LITTLE ENERGY: MORE THAN HALF THE DAYS
5. POOR APPETITE OR OVEREATING: SEVERAL DAYS
6. FEELING BAD ABOUT YOURSELF - OR THAT YOU ARE A FAILURE OR HAVE LET YOURSELF OR YOUR FAMILY DOWN: NOT AT ALL
SUM OF ALL RESPONSES TO PHQ QUESTIONS 1-9: 4
3. TROUBLE FALLING OR STAYING ASLEEP: NOT AT ALL
10. IF YOU CHECKED OFF ANY PROBLEMS, HOW DIFFICULT HAVE THESE PROBLEMS MADE IT FOR YOU TO DO YOUR WORK, TAKE CARE OF THINGS AT HOME, OR GET ALONG WITH OTHER PEOPLE: VERY DIFFICULT
8. MOVING OR SPEAKING SO SLOWLY THAT OTHER PEOPLE COULD HAVE NOTICED. OR THE OPPOSITE - BEING SO FIDGETY OR RESTLESS THAT YOU HAVE BEEN MOVING AROUND A LOT MORE THAN USUAL: NOT AT ALL
10. IF YOU CHECKED OFF ANY PROBLEMS, HOW DIFFICULT HAVE THESE PROBLEMS MADE IT FOR YOU TO DO YOUR WORK, TAKE CARE OF THINGS AT HOME, OR GET ALONG WITH OTHER PEOPLE: VERY DIFFICULT
3. TROUBLE FALLING OR STAYING ASLEEP: NOT AT ALL
SUM OF ALL RESPONSES TO PHQ QUESTIONS 1-9: 4
7. TROUBLE CONCENTRATING ON THINGS, SUCH AS READING THE NEWSPAPER OR WATCHING TELEVISION: SEVERAL DAYS
1. LITTLE INTEREST OR PLEASURE IN DOING THINGS: NOT AT ALL
9. THOUGHTS THAT YOU WOULD BE BETTER OFF DEAD, OR OF HURTING YOURSELF: NOT AT ALL
5. POOR APPETITE OR OVEREATING: SEVERAL DAYS
SUM OF ALL RESPONSES TO PHQ QUESTIONS 1-9: 4
2. FEELING DOWN, DEPRESSED OR HOPELESS: NOT AT ALL
1. LITTLE INTEREST OR PLEASURE IN DOING THINGS: NOT AT ALL
SUM OF ALL RESPONSES TO PHQ QUESTIONS 1-9: 4
9. THOUGHTS THAT YOU WOULD BE BETTER OFF DEAD, OR OF HURTING YOURSELF: NOT AT ALL
4. FEELING TIRED OR HAVING LITTLE ENERGY: MORE THAN HALF THE DAYS

## 2025-07-07 DIAGNOSIS — Z01.818 PREOPERATIVE TESTING: ICD-10-CM

## 2025-07-07 DIAGNOSIS — E03.9 HYPOTHYROIDISM, UNSPECIFIED TYPE: ICD-10-CM

## 2025-07-07 LAB
ALBUMIN: 4.4 G/DL (ref 3.5–5.2)
ALP BLD-CCNC: 85 U/L (ref 35–104)
ALT SERPL-CCNC: 34 U/L (ref 0–35)
ANION GAP SERPL CALCULATED.3IONS-SCNC: 11 MMOL/L (ref 7–16)
AST SERPL-CCNC: 146 U/L (ref 0–35)
BILIRUB SERPL-MCNC: 0.7 MG/DL (ref 0–1.2)
BUN BLDV-MCNC: 11 MG/DL (ref 8–23)
CALCIUM SERPL-MCNC: 9.5 MG/DL (ref 8.8–10.2)
CHLORIDE BLD-SCNC: 106 MMOL/L (ref 98–107)
CHOLESTEROL, TOTAL: 210 MG/DL
CO2: 25 MMOL/L (ref 22–29)
CREAT SERPL-MCNC: 0.8 MG/DL (ref 0.5–1)
GFR, ESTIMATED: 86 ML/MIN/1.73M2
GLUCOSE BLD-MCNC: 87 MG/DL (ref 74–99)
HDLC SERPL-MCNC: 76 MG/DL
LDL CHOLESTEROL: 114 MG/DL
POTASSIUM SERPL-SCNC: 4.2 MMOL/L (ref 3.5–5.1)
SODIUM BLD-SCNC: 141 MMOL/L (ref 136–145)
T4 FREE: 0.8 NG/DL (ref 0.9–1.7)
TOTAL PROTEIN: 6.7 G/DL (ref 6.4–8.3)
TRIGL SERPL-MCNC: 98 MG/DL
TSH SERPL DL<=0.05 MIU/L-ACNC: 5.57 UIU/ML (ref 0.27–4.2)
VLDLC SERPL CALC-MCNC: 20 MG/DL

## 2025-07-08 ENCOUNTER — OFFICE VISIT (OUTPATIENT)
Age: 60
End: 2025-07-08
Payer: COMMERCIAL

## 2025-07-08 VITALS
RESPIRATION RATE: 18 BRPM | BODY MASS INDEX: 23.99 KG/M2 | TEMPERATURE: 97.2 F | WEIGHT: 140.5 LBS | SYSTOLIC BLOOD PRESSURE: 139 MMHG | DIASTOLIC BLOOD PRESSURE: 87 MMHG | HEART RATE: 73 BPM | HEIGHT: 64 IN | OXYGEN SATURATION: 98 %

## 2025-07-08 DIAGNOSIS — Z01.419 ENCOUNTER FOR GYNECOLOGICAL EXAMINATION WITHOUT ABNORMAL FINDING: ICD-10-CM

## 2025-07-08 DIAGNOSIS — Z12.4 SCREENING FOR CERVICAL CANCER: Primary | ICD-10-CM

## 2025-07-08 PROCEDURE — 99396 PREV VISIT EST AGE 40-64: CPT | Performed by: OBSTETRICS & GYNECOLOGY

## 2025-07-08 PROCEDURE — 99459 PELVIC EXAMINATION: CPT | Performed by: OBSTETRICS & GYNECOLOGY

## 2025-07-08 NOTE — CONSULTS
Session ID: [id number]  Session Duration: Longer than 54 minutes  Language: ASL   ID: #[id number]   Name: Karo

## 2025-07-08 NOTE — PROGRESS NOTES
Pt here today for annual exam  postmenopausal  Last pap unsure  Last mammo 7/10/24  No concerns or complaints    Discharge instructions have been discussed with the patient.   Patient advised to call our office with any questions or concerns.   Voiced understanding.

## 2025-07-08 NOTE — PROGRESS NOTES
Subjective:      Raysa Andrews is a 60 y.o. female who presents for an annual exam. The patient has no complaints today. The patient is sexually active.   Wears seatbelts: yes last pap: Normal Pap collected today.  Mammogram ordered.  Discussed with patient need need for yearly mammogram through same .  Regular exercise: yes  Ever been transfused or tattooed?: yes  The patient reports that domestic violence in her life is absent.   Menstrual History:  OB History          3    Para   3    Term   3            AB        Living   3         SAB        IAB        Ectopic        Molar        Multiple        Live Births   3               Menarche age: Age 12  No LMP recorded. Patient has had a hysterectomy.       Past Medical History:   Diagnosis Date    Deaf     Depression 2 yrs ago    Hyperlipidemia     Hyperthyroidism 23 yrs ago    Thyroid disease      Patient Active Problem List    Diagnosis Date Noted    Hyperlipidemia 2022    Gastroesophageal reflux disease without esophagitis 2022    Hashimoto's thyroiditis 2022    Carpal tunnel syndrome of right wrist 2022    Chronic deafness 2019    Alopecia 10/14/2015    Ganglion of hand, right 2024    Ganglion of wrist, right 2024    Moderate episode of recurrent major depressive disorder (HCC) 2024     Past Surgical History:   Procedure Laterality Date    CARPAL TUNNEL RELEASE Bilateral     COLONOSCOPY      CYST REMOVAL      axilla    FOOT SURGERY      spur    HAND SURGERY Right 2024    RIGHT THUMB MUCOUS CYST REMOVAL AND RIGHT WRIST GANGLION CYST REMOVAL-24 performed by Omid Ulrich DO at [facility] OR    HYSTERECTOMY (CERVIX STATUS UNKNOWN) N/A 2022    ROBOTIC ASSISTED TOTAL LAPAROSCOPIC HYSTERECTOMY, BILATERAL SALPINGO-OOPHORECTOMY (CPT 11985) performed by Sherri Wakefield MD at [facility] OR    TONSILLECTOMY       Family History   Problem Relation Age of Onset    Diabetes  Statement Selected

## 2025-07-09 ENCOUNTER — PROCEDURE VISIT (OUTPATIENT)
Dept: PHYSICAL MEDICINE AND REHAB | Age: 60
End: 2025-07-09

## 2025-07-09 ENCOUNTER — OFFICE VISIT (OUTPATIENT)
Dept: PRIMARY CARE CLINIC | Age: 60
End: 2025-07-09

## 2025-07-09 VITALS
WEIGHT: 139.2 LBS | HEIGHT: 64 IN | TEMPERATURE: 98 F | BODY MASS INDEX: 23.76 KG/M2 | DIASTOLIC BLOOD PRESSURE: 78 MMHG | SYSTOLIC BLOOD PRESSURE: 110 MMHG | OXYGEN SATURATION: 96 % | HEART RATE: 74 BPM

## 2025-07-09 VITALS — HEIGHT: 64 IN | BODY MASS INDEX: 23.73 KG/M2 | WEIGHT: 139 LBS

## 2025-07-09 DIAGNOSIS — E78.5 HYPERLIPIDEMIA, UNSPECIFIED HYPERLIPIDEMIA TYPE: ICD-10-CM

## 2025-07-09 DIAGNOSIS — K21.9 GASTROESOPHAGEAL REFLUX DISEASE WITHOUT ESOPHAGITIS: ICD-10-CM

## 2025-07-09 DIAGNOSIS — Z11.59 NEED FOR HEPATITIS C SCREENING TEST: ICD-10-CM

## 2025-07-09 DIAGNOSIS — E03.9 HYPOTHYROIDISM, UNSPECIFIED TYPE: Primary | ICD-10-CM

## 2025-07-09 DIAGNOSIS — G56.22 CUBITAL TUNNEL SYNDROME ON LEFT: ICD-10-CM

## 2025-07-09 PROBLEM — I83.91 VARICOSE VEINS OF RIGHT LOWER EXTREMITY: Status: ACTIVE | Noted: 2025-07-09

## 2025-07-09 RX ORDER — LEVOTHYROXINE SODIUM 75 UG/1
75 TABLET ORAL DAILY
Qty: 90 TABLET | Refills: 1 | Status: SHIPPED | OUTPATIENT
Start: 2025-07-09

## 2025-07-09 RX ORDER — SIMVASTATIN 10 MG
10 TABLET ORAL NIGHTLY
Qty: 90 TABLET | Refills: 1 | Status: SHIPPED | OUTPATIENT
Start: 2025-07-09

## 2025-07-09 NOTE — CONSULTS
Session ID: [id number]  Session Duration: Longer than 51 minutes  Language: ASL   ID: #[id number]   Name: Charanjitage: ASL   ID: #[id number]   Name: Abimael

## 2025-07-09 NOTE — PROGRESS NOTES
Weight: 63.1 kg (139 lb 3.2 oz)   Height: 1.626 m (5' 4\")       General:  Patient alert and oriented x 3, NAD, pleasant  HEENT:  Atraumatic, normocephalic, PERRLA, EOMI, clear conjunctiva, TMs clear, nose-clear, throat - no erythema  Neck:  Supple, no goiter, no carotid bruits, no LAD  Lungs:  CTA   Heart:  RRR, no murmurs, gallops or rubs  Abdomen:  Soft/nt/nd, + bowel sounds  Lymph node examination: unremarkable  Neurological exam : unremarkable  Extremities:  No clubbing, cyanosis or edema  Skin: unremarkable    Cholesterol, Total   Date Value Ref Range Status   07/07/2025 210 (H) <200 mg/dL Final     Triglycerides   Date Value Ref Range Status   07/07/2025 98 <150 mg/dL Final     HDL   Date Value Ref Range Status   07/07/2025 76 >40 mg/dL Final     VLDL   Date Value Ref Range Status   07/07/2025 20 mg/dL Final     Comment:     No normal range established.     Sodium   Date Value Ref Range Status   07/07/2025 141 136 - 145 mmol/L Final     Potassium   Date Value Ref Range Status   07/07/2025 4.2 3.5 - 5.1 mmol/L Final     Chloride   Date Value Ref Range Status   07/07/2025 106 98 - 107 mmol/L Final     CO2   Date Value Ref Range Status   07/07/2025 25 22 - 29 mmol/L Final     BUN   Date Value Ref Range Status   07/07/2025 11 8 - 23 mg/dL Final     Creatinine   Date Value Ref Range Status   07/07/2025 0.8 0.5 - 1.0 mg/dL Final     Glucose   Date Value Ref Range Status   07/07/2025 87 74 - 99 mg/dL Final     Calcium   Date Value Ref Range Status   07/07/2025 9.5 8.8 - 10.2 mg/dL Final     Total Bilirubin   Date Value Ref Range Status   07/07/2025 0.7 0.0 - 1.2 mg/dL Final     Alkaline Phosphatase   Date Value Ref Range Status   07/07/2025 85 35 - 104 U/L Final     AST   Date Value Ref Range Status   07/07/2025 146 (H) 0 - 35 U/L Final     ALT   Date Value Ref Range Status   07/07/2025 34 0 - 35 U/L Final     Est, Glom Filt Rate   Date Value Ref Range Status   07/07/2025 86 >60 mL/min/1.73m2 Final     Comment:

## 2025-07-09 NOTE — PATIENT INSTRUCTIONS
Electrodiagnotic Laboratory  [facility] the [facility] with [facility]  YARA Kline D.O.   [facility]  [address] [place] [address]  Phone: [phone]  Fax: [phone]        Today you had an electrodiagnostic exam which included nerve conduction studies (NCS) and electromyography (EMG). This test evaluated the electrical activity of your nerves and muscles to help determine if you have a nerve or muscle disease.  This test can help determine the location and type of a nerve or muscle problem. This will help your referring doctor diagnose your condition and determine the appropriate next step in your treatment plan.     After your test:    1. There are no long lasting side effects of the test.     2. You may resume your normal activities without restrictions.     3.  Resume any medications that were stopped for the test.     4  If you have sore areas or bruising in your muscles where the needle was placed, apply a cold pack to the sore area for 15-20 minutes three to four times a day as needed for pain.  The soreness should go away in about 1-2 days.     5. Your results were provided  Briefly at the end of your test and the final detailed report will be provided to your referring physician, and/or primary care physician and any other parties you requested within 1-2 days of the examination. You may wish to contact your referring provider after a few days to determine what they would like you to do next.     6.  Please call [phone] with any questions or concerns and if you develop increased body temperature/fever, swelling, tenderness, increased pain and/or drainage from the sites where the needle was placed.     Thank you for choosing us for your health care needs.

## 2025-07-10 NOTE — PROGRESS NOTES
Electrodiagnostic Laboratory  *Accredited by the [facility] with exemplary status  [address] [place] [address]  Phone: [phone]  Fax: [phone]      Date of Examination: 07/10/25    Patient Name: Raysa Andrews    An independent historian was not needed.     Raysa Andrews  is a 60 y.o. year old female who was seen today regarding   Chief Complaint   Patient presents with    Extremity Pain     None    Numbness     Numbness/tingling in the last two fingers down the arm. Since about April or May. After a surgery on stomach. Symp and off and on. Worse when sleeping    Extremity Weakness     None   .     The symptoms are intermittent.       I have reviewed the referring provider's office note.    There is not a family history of neuromuscular conditions.     Physical Exam: General: The patient is in no apparent distress.  MSK: There is no joint effusion, deformity, instability, swelling, erythema or warmth.  AROM is full in the spine and extremities. +Tinel left elbow. Neurologic: Diminished light touch ulnar hand, decreased left , otherwise, no focal sensorimotor deficit.  Reflexes 2+ and symmetric. Gait is normal.    Impression:     1. Cubital tunnel syndrome on left        Plan:   EMG is indicated to evaluate the above diagnosis.    EMG was done today and showed left cubital tunnel syndrome. Consider surgical intervention given axonal loss. Advised to avoid repetitive elbow flexion, resting on elbows and to use a soft splint at hs.    The patient was educated about the diagnosis and the prognosis.   Advised patient to follow up with referring provider.       Thank you for allowing me to participate in the care of your patient.      Sincerely,     Brooke Reich, DO                
prognosis for recovery of demyelinating lesions is good if the cause is alleviated.         Previous Study: There is not a prior study for comparison.     Follow up EMG is recommended if clinically warranted.     Technologist: Eli Hurst  Physician:    Brooke Reich D.O., P.T.  Medical Director, [facility]  Board Certified Electrodiagnostic Medicine  Board Certified Physical Medicine and Rehabilitation      Nerve conduction studies and electromyography were performed according to our laboratory policies and procedures which can be provided upon request. All abnormal values are identified in the table. Laboratory normal values can also be provided upon request.       Cc: Omid Ulrich DO Ragheb, Nevine, MD

## 2025-07-11 ENCOUNTER — PATIENT MESSAGE (OUTPATIENT)
Dept: PRIMARY CARE CLINIC | Age: 60
End: 2025-07-11

## 2025-07-23 ENCOUNTER — TELEPHONE (OUTPATIENT)
Age: 60
End: 2025-07-23

## 2025-07-23 LAB — GYNECOLOGY CYTOLOGY REPORT: NORMAL

## 2025-07-23 NOTE — TELEPHONE ENCOUNTER
Pt would like to know if she is good to have colposcopy given her surgical history with  Dr. Massey. Please advise.

## 2025-07-25 ENCOUNTER — PROCEDURE VISIT (OUTPATIENT)
Age: 60
End: 2025-07-25

## 2025-07-25 VITALS
HEART RATE: 71 BPM | BODY MASS INDEX: 23.8 KG/M2 | RESPIRATION RATE: 18 BRPM | TEMPERATURE: 98.7 F | WEIGHT: 139.4 LBS | SYSTOLIC BLOOD PRESSURE: 109 MMHG | OXYGEN SATURATION: 98 % | DIASTOLIC BLOOD PRESSURE: 74 MMHG | HEIGHT: 64 IN

## 2025-07-25 DIAGNOSIS — R87.622 LGSIL PAP SMEAR OF VAGINA: ICD-10-CM

## 2025-07-25 DIAGNOSIS — N95.2 ATROPHIC VAGINITIS: Primary | ICD-10-CM

## 2025-07-25 RX ORDER — ESTRADIOL 0.1 MG/G
1 CREAM VAGINAL
Qty: 42.5 G | Refills: 3 | Status: SHIPPED | OUTPATIENT
Start: 2025-07-28

## 2025-07-25 NOTE — PROGRESS NOTES
Colposcopy Procedure Note    Indications: Pap smear 1 months ago showed: low-grade squamous intraepithelial neoplasia (LGSIL - encompassing HPV,mild dysplasia,GORDON I). The prior pap showed no abnormalities.  Prior cervical/vaginal disease: normal exam without visible pathology. Prior cervical treatment: Possibly noted to have atrophic vaginitis with surgically absent cervix due to hysterectomy done remotely.  Patient says some changes of the cuff likely consistent with atrophic vaginitis and not dysplasia.  Will start vaginal estrogen therapy twice weekly.    Procedure Details   The risks and benefits of the procedure and Written informed consent obtained.    Speculum placed in vagina and excellent visualization of cervix achieved, cervix swabbed x 3 with acetic acid solution.    Findings:  Cervix: no visible lesions; vaginal cuff swabbed with Lugol's solution.  Vaginal inspection: normal without visible lesions.  Vulvar colposcopy: vulvar colposcopy not performed.    Specimens: None    Complications: none.    Plan:  Repeat Pap 6 months start estrogen vaginal therapy using Estrace twice weekly instructed on use.  Follow-up 6 months for repeat Pap

## 2025-07-25 NOTE — PROGRESS NOTES
Pt presents for Colposcopy.    Consent obtained and signed by pt, provider and this MA.   Medications and allergies reviewed with pt.   Procedural timeout preformed prior to start of procedure.

## 2025-08-18 ENCOUNTER — HOSPITAL ENCOUNTER (OUTPATIENT)
Dept: MAMMOGRAPHY | Age: 60
Discharge: HOME | End: 2025-08-20
Attending: OBSTETRICS & GYNECOLOGY
Payer: COMMERCIAL

## 2025-08-18 VITALS — BODY MASS INDEX: 23.56 KG/M2 | WEIGHT: 138 LBS | HEIGHT: 64 IN

## 2025-08-18 DIAGNOSIS — Z01.419 ENCOUNTER FOR GYNECOLOGICAL EXAMINATION WITHOUT ABNORMAL FINDING: ICD-10-CM

## 2025-08-18 DIAGNOSIS — Z12.4 SCREENING FOR CERVICAL CANCER: ICD-10-CM

## 2025-08-18 PROCEDURE — 77063 BREAST TOMOSYNTHESIS BI: CPT

## 2025-08-19 ENCOUNTER — OFFICE VISIT (OUTPATIENT)
Dept: ORTHOPEDIC SURGERY | Age: 60
End: 2025-08-19
Payer: COMMERCIAL

## 2025-08-19 DIAGNOSIS — G56.22 CUBITAL TUNNEL SYNDROME ON LEFT: Primary | ICD-10-CM

## 2025-08-19 PROCEDURE — 99213 OFFICE O/P EST LOW 20 MIN: CPT | Performed by: ORTHOPAEDIC SURGERY

## 2025-08-22 ENCOUNTER — TELEPHONE (OUTPATIENT)
Age: 60
End: 2025-08-22

## 2025-08-26 RX ORDER — SENNOSIDES 8.6 MG/1
1 TABLET ORAL 2 TIMES DAILY
Qty: 60 TABLET | Refills: 11 | Status: SHIPPED | OUTPATIENT
Start: 2025-08-26 | End: 2026-08-26

## (undated) DEVICE — ELECTRODE PT RET AD L9FT HI MOIST COND ADH HYDRGEL CORDED

## (undated) DEVICE — SYSTEM ES CUP DIA3.5CM PNEUMO OCCL BLLN DISP FOR CLIN POS

## (undated) DEVICE — MARKER,SKIN,WI/RULER AND LABELS: Brand: MEDLINE

## (undated) DEVICE — NEEDLE HYPO 25GA L1.5IN BLU POLYPR HUB S STL REG BVL STR

## (undated) DEVICE — GOWN SURG XL SMS FAB NONREINFORCED RAGLAN SLV HK LOOP CLSR

## (undated) DEVICE — GOWN,SIRUS,NONRNF,SETINSLV,XL,20/CS: Brand: MEDLINE

## (undated) DEVICE — Z DISCONTINUED USE 2272124 DRAPE SURG XL N INVASIVE 2 LAYR DISP

## (undated) DEVICE — CAMERA STRYKER 1488 HD GEN

## (undated) DEVICE — GAUZE,SPONGE,4"X4",16PLY,STRL,LF,10/TRAY: Brand: MEDLINE

## (undated) DEVICE — AIRSEAL 12 MM ACCESS PORT AND PALM GRIP OBTURATOR WITH BLADELESS OPTICAL TIP, 120 MM LENGTH: Brand: AIRSEAL

## (undated) DEVICE — STRIP,CLOSURE,WOUND,MEDI-STRIP,1/2X4: Brand: MEDLINE

## (undated) DEVICE — MONOJECT MAGELLAN 1 ML TUBERCULIN SAFETY SYRINGE PERMANENT NEEDLE 28G X1/2 IN. (0.36 X 13 MM): Brand: MONOJECT

## (undated) DEVICE — COVER,LIGHT HANDLE,FLX,1/PK: Brand: MEDLINE INDUSTRIES, INC.

## (undated) DEVICE — INTENDED FOR TISSUE SEPARATION, AND OTHER PROCEDURES THAT REQUIRE A SHARP SURGICAL BLADE TO PUNCTURE OR CUT.: Brand: BARD-PARKER ® STAINLESS STEEL BLADES

## (undated) DEVICE — SET ENDO INSTR LAPAROSCOPIC INCISIONAL

## (undated) DEVICE — TRI-LUMEN FILTERED TUBE SET WITH ACTIVATED CHARCOAL FILTER: Brand: AIRSEAL

## (undated) DEVICE — SLING ARM 9 1/2X20IN L MULTIPAK

## (undated) DEVICE — ARM DRAPE

## (undated) DEVICE — 20 ML SYRINGE REGULAR TIP: Brand: MONOJECT

## (undated) DEVICE — PERMANENT CAUTERY HOOK: Brand: ENDOWRIST

## (undated) DEVICE — COLUMN DRAPE

## (undated) DEVICE — Z INACTIVE USE 2660664 SOLUTION IRRIG 3000ML 0.9% SOD CHL USP UROMATIC PLAS CONT

## (undated) DEVICE — SET INST DAVINCI XI ACCESSORIES

## (undated) DEVICE — Z INACTIVE USE 2735373 APPLICATOR FBR LAIN COT WOOD TIP ECONOMICAL

## (undated) DEVICE — BANDAGE,GAUZE,4.5"X4.1YD,STERILE,LF: Brand: MEDLINE

## (undated) DEVICE — GLOVE SURG SZ 8 L11.2IN FNGR THK12.7MIL CUF THK9.7MIL BRN

## (undated) DEVICE — SOLUTION SURG PREP ANTIMICROBIAL 4 OZ SKIN WND EXIDINE

## (undated) DEVICE — PACK,AURORA,LAVH: Brand: MEDLINE

## (undated) DEVICE — [HIGH FLOW INSUFFLATOR,  DO NOT USE IF PACKAGE IS DAMAGED,  KEEP DRY,  KEEP AWAY FROM SUNLIGHT,  PROTECT FROM HEAT AND RADIOACTIVE SOURCES.]: Brand: PNEUMOSURE

## (undated) DEVICE — SOLUTION IV IRRIG POUR BRL 0.9% SODIUM CHL 2F7124

## (undated) DEVICE — GAUZE,SPONGE,4"X4",16PLY,XRAY,STRL,LF: Brand: MEDLINE

## (undated) DEVICE — CATHETER,FOLEY,3-WAY,18FR,30ML,100% SILI: Brand: MEDLINE

## (undated) DEVICE — APPLICATOR PREP 26ML 0.7% IOD POVACRYLEX 74% ISO ALC ST

## (undated) DEVICE — BLADE CLIPPER GEN PURP NS

## (undated) DEVICE — NEEDLE SPNL 22GA L3.5IN BLK HUB S STL REG WALL FIT STYL W/

## (undated) DEVICE — SYRINGE, LUER LOCK, 10ML: Brand: MEDLINE

## (undated) DEVICE — BLADELESS OBTURATOR: Brand: WECK VISTA

## (undated) DEVICE — HANDLE CVR PATENTED RETENTION DISC STRL LIGHT SHLD

## (undated) DEVICE — BASIC PACK: Brand: CONVERTORS

## (undated) DEVICE — PEN: MARKING STD 100/CS: Brand: MEDICAL ACTION INDUSTRIES

## (undated) DEVICE — HOOK LOCK LATEX FREE ELASTIC BANDAGE 3INX5YD

## (undated) DEVICE — PROGRASP FORCEPS: Brand: ENDOWRIST

## (undated) DEVICE — MONOJECT MAGELLAN 1 ML TUBERCULIN SAFETY SYRINGE PERMANENT NEEDLE 27G X1/2 IN. (0.4 X 13 MM): Brand: MONOJECT

## (undated) DEVICE — ELECTRO LUBE IS A SINGLE PATIENT USE DEVICE THAT IS INTENDED TO BE USED ON ELECTROSURGICAL ELECTRODES TO REDUCE STICKING.: Brand: KEY SURGICAL ELECTRO LUBE

## (undated) DEVICE — Z INACTIVE USE 2660665 SOLUTION IRRIG 1000ML 0.9% SOD CHL USP POUR PLAS BTL

## (undated) DEVICE — TRAY,VAG PREP,2PR VNYL GLV,4 C: Brand: MEDLINE INDUSTRIES, INC.

## (undated) DEVICE — CYSTOSCOPE LENSES AND CORDS

## (undated) DEVICE — GLOVE SURG SZ 65 THK91MIL LTX FREE SYN POLYISOPRENE

## (undated) DEVICE — GARMENT,MEDLINE,DVT,INT,CALF,MED, GEN2: Brand: MEDLINE

## (undated) DEVICE — APPLICATOR MEDICATED 26 CC SOLUTION HI LT ORNG CHLORAPREP

## (undated) DEVICE — SYRINGE MED 50ML LUERLOCK TIP

## (undated) DEVICE — TOWEL OR BLUEE 16X26IN ST 8 PACK ORB08 16X26ORTWL

## (undated) DEVICE — BANDAGE COMPR W4XL108IN WHT LAYERED NO CLSR SYN RUB ESMARCH

## (undated) DEVICE — NDL CNTR 40CT FM MAG: Brand: MEDLINE INDUSTRIES, INC.

## (undated) DEVICE — MEDI-VAC NON-CONDUCTIVE SUCTION TUBING: Brand: CARDINAL HEALTH

## (undated) DEVICE — SYRINGE MED 10ML LUERLOCK TIP W/O SFTY DISP

## (undated) DEVICE — CANNULA SEAL

## (undated) DEVICE — TIP IU L6CM DIA5.1MM LAV SIL SFT FLX DST END DISP RUMI II

## (undated) DEVICE — INSUFFLATION NEEDLE TO ESTABLISH PNEUMOPERITONEUM.: Brand: INSUFFLATION NEEDLE

## (undated) DEVICE — SCOPE DAVINCI XI 0 DEG W/CORD

## (undated) DEVICE — TOWEL,OR,DSP,ST,BLUE,STD,6/PK,12PK/CS: Brand: MEDLINE

## (undated) DEVICE — PUMP SUC IRR TBNG L10FT W/ HNDPC ASSEMB STRYKEFLOW 2

## (undated) DEVICE — 40586 ADVANCED TRENDELENBURG POSITIONING KIT: Brand: 40586 ADVANCED TRENDELENBURG POSITIONING KIT

## (undated) DEVICE — MEGA NEEDLE DRIVER: Brand: ENDOWRIST

## (undated) DEVICE — CYSTO/BLADDER IRRIGATION SET, REGULATING CLAMP

## (undated) DEVICE — SOLUTION INJ ST H2O VIAFLX PLAS 1000ML CONT FOR DRUG DIL

## (undated) DEVICE — BANDAGE,SELF ADHRNT,COFLEX,4"X5YD,STRL: Brand: COLABEL

## (undated) DEVICE — BINDER ABD M/L H12IN FOR 46-62IN WHT 4 SLD PNL DSGN HOOP

## (undated) DEVICE — STANDARD HYPODERMIC NEEDLE,POLYPROPYLENE HUB: Brand: MONOJECT

## (undated) DEVICE — ANTI-FOG SOLUTION WITH FOAM PAD: Brand: DEVON

## (undated) DEVICE — PAD MATERNITY CURITY ADH STRIP DISP

## (undated) DEVICE — 3M™ STERI-DRAPE™ U-DRAPE, LONG 1019: Brand: STERI-DRAPE™

## (undated) DEVICE — DRESSING GZ XRFRM 4X4(25/BX 6BX/CS)

## (undated) DEVICE — SET RUMI WITH RESIN KOH CUPS

## (undated) DEVICE — SOLUTION IV 100ML 0.9% SOD CHL PLAS CONT USP VIAFLX 1 PER

## (undated) DEVICE — VESSEL SEALER EXTEND: Brand: ENDOWRIST

## (undated) DEVICE — GLOVE SURG 7 LTX TRIFLEX WIDE FINGER PWDR

## (undated) DEVICE — SUTURE V-LOC 180 SZ 0 L9IN ABSRB GRN GS-21 L37MM 1/2 CIR VLOCL0346

## (undated) DEVICE — TOTAL TRAY, 16FR 10ML SIL FOLEY, URN: Brand: MEDLINE

## (undated) DEVICE — TRAY PROCED DILATATION CURETTAGE

## (undated) DEVICE — DOUBLE BASIN SET: Brand: MEDLINE INDUSTRIES, INC.

## (undated) DEVICE — SYRINGE IRRIG 60ML SFT PLIABLE BLB EZ TO GRP 1 HND USE W/

## (undated) DEVICE — 1810 FOAM BLOCK NEEDLE COUNTER: Brand: DEVON

## (undated) DEVICE — PADDING CAST 4 YDX3 IN COTTON NS WBRL

## (undated) DEVICE — MICRO TIP WIPE: Brand: DEVON